# Patient Record
Sex: MALE | Race: BLACK OR AFRICAN AMERICAN | NOT HISPANIC OR LATINO | Employment: OTHER | RURAL
[De-identification: names, ages, dates, MRNs, and addresses within clinical notes are randomized per-mention and may not be internally consistent; named-entity substitution may affect disease eponyms.]

---

## 2017-04-07 DIAGNOSIS — I63.9 CEREBROVASCULAR ACCIDENT (CVA), UNSPECIFIED MECHANISM (HCC): ICD-10-CM

## 2017-04-07 RX ORDER — CLOPIDOGREL BISULFATE 75 MG/1
TABLET ORAL
Qty: 90 TABLET | Refills: 1 | Status: SHIPPED | OUTPATIENT
Start: 2017-04-07 | End: 2017-06-08 | Stop reason: SDUPTHER

## 2017-06-08 ENCOUNTER — OFFICE VISIT (OUTPATIENT)
Dept: FAMILY MEDICINE CLINIC | Facility: CLINIC | Age: 72
End: 2017-06-08

## 2017-06-08 VITALS
DIASTOLIC BLOOD PRESSURE: 80 MMHG | HEART RATE: 88 BPM | SYSTOLIC BLOOD PRESSURE: 114 MMHG | BODY MASS INDEX: 19.7 KG/M2 | RESPIRATION RATE: 18 BRPM | TEMPERATURE: 98.5 F | HEIGHT: 68 IN | OXYGEN SATURATION: 99 % | WEIGHT: 130 LBS

## 2017-06-08 DIAGNOSIS — R29.898 WEAKNESS OF LEFT LEG: Primary | ICD-10-CM

## 2017-06-08 DIAGNOSIS — R21 RASH: ICD-10-CM

## 2017-06-08 DIAGNOSIS — I10 ESSENTIAL HYPERTENSION: ICD-10-CM

## 2017-06-08 DIAGNOSIS — N40.0 BENIGN PROSTATIC HYPERPLASIA, PRESENCE OF LOWER URINARY TRACT SYMPTOMS UNSPECIFIED, UNSPECIFIED MORPHOLOGY: ICD-10-CM

## 2017-06-08 DIAGNOSIS — K21.9 GASTROESOPHAGEAL REFLUX DISEASE, ESOPHAGITIS PRESENCE NOT SPECIFIED: ICD-10-CM

## 2017-06-08 DIAGNOSIS — I63.9 CEREBROVASCULAR ACCIDENT (CVA), UNSPECIFIED MECHANISM (HCC): ICD-10-CM

## 2017-06-08 PROCEDURE — 99214 OFFICE O/P EST MOD 30 MIN: CPT | Performed by: NURSE PRACTITIONER

## 2017-06-08 RX ORDER — RAMIPRIL 5 MG/1
5 CAPSULE ORAL DAILY
Qty: 14 CAPSULE | Refills: 1 | Status: SHIPPED | OUTPATIENT
Start: 2017-06-08 | End: 2017-06-08 | Stop reason: SDUPTHER

## 2017-06-08 RX ORDER — RANITIDINE 150 MG/1
150 TABLET ORAL 2 TIMES DAILY
Qty: 180 TABLET | Refills: 1 | Status: SHIPPED | OUTPATIENT
Start: 2017-06-08 | End: 2017-12-19 | Stop reason: SDUPTHER

## 2017-06-08 RX ORDER — TRIAMCINOLONE ACETONIDE 1 MG/G
CREAM TOPICAL 2 TIMES DAILY
Qty: 45 G | Refills: 3 | Status: SHIPPED | OUTPATIENT
Start: 2017-06-08 | End: 2017-06-08 | Stop reason: SDUPTHER

## 2017-06-08 RX ORDER — RANITIDINE 150 MG/1
150 TABLET ORAL 2 TIMES DAILY
Qty: 180 TABLET | Refills: 1 | Status: SHIPPED | OUTPATIENT
Start: 2017-06-08 | End: 2017-06-08 | Stop reason: SDUPTHER

## 2017-06-08 RX ORDER — RAMIPRIL 5 MG/1
5 CAPSULE ORAL DAILY
Qty: 90 CAPSULE | Refills: 1 | Status: SHIPPED | OUTPATIENT
Start: 2017-06-08 | End: 2017-06-08 | Stop reason: SDUPTHER

## 2017-06-08 RX ORDER — AMLODIPINE BESYLATE 5 MG/1
5 TABLET ORAL DAILY
Qty: 90 TABLET | Refills: 1 | Status: SHIPPED | OUTPATIENT
Start: 2017-06-08 | End: 2017-06-08 | Stop reason: SDUPTHER

## 2017-06-08 RX ORDER — TAMSULOSIN HYDROCHLORIDE 0.4 MG/1
1 CAPSULE ORAL NIGHTLY
Qty: 90 CAPSULE | Refills: 1 | Status: SHIPPED | OUTPATIENT
Start: 2017-06-08 | End: 2017-06-08 | Stop reason: SDUPTHER

## 2017-06-08 RX ORDER — RAMIPRIL 5 MG/1
5 CAPSULE ORAL DAILY
Qty: 90 CAPSULE | Refills: 1 | Status: SHIPPED | OUTPATIENT
Start: 2017-06-08 | End: 2017-12-19 | Stop reason: SDUPTHER

## 2017-06-08 RX ORDER — TAMSULOSIN HYDROCHLORIDE 0.4 MG/1
1 CAPSULE ORAL NIGHTLY
Qty: 90 CAPSULE | Refills: 1 | Status: SHIPPED | OUTPATIENT
Start: 2017-06-08 | End: 2017-12-19 | Stop reason: SDUPTHER

## 2017-06-08 RX ORDER — CLOPIDOGREL BISULFATE 75 MG/1
75 TABLET ORAL DAILY
Qty: 90 TABLET | Refills: 1 | Status: SHIPPED | OUTPATIENT
Start: 2017-06-08 | End: 2017-10-27 | Stop reason: SDUPTHER

## 2017-06-08 RX ORDER — ASPIRIN 81 MG/1
81 TABLET, CHEWABLE ORAL DAILY
Qty: 90 TABLET | Refills: 1 | Status: SHIPPED | OUTPATIENT
Start: 2017-06-08 | End: 2017-12-19 | Stop reason: SDUPTHER

## 2017-06-08 RX ORDER — ASPIRIN 81 MG/1
81 TABLET, CHEWABLE ORAL DAILY
Qty: 90 TABLET | Refills: 1 | Status: SHIPPED | OUTPATIENT
Start: 2017-06-08 | End: 2017-06-08 | Stop reason: SDUPTHER

## 2017-06-08 RX ORDER — AMLODIPINE BESYLATE 5 MG/1
5 TABLET ORAL DAILY
Qty: 90 TABLET | Refills: 1 | Status: SHIPPED | OUTPATIENT
Start: 2017-06-08 | End: 2017-12-19 | Stop reason: SDUPTHER

## 2017-06-08 RX ORDER — TRIAMCINOLONE ACETONIDE 1 MG/G
CREAM TOPICAL 2 TIMES DAILY
Qty: 45 G | Refills: 3 | Status: SHIPPED | OUTPATIENT
Start: 2017-06-08 | End: 2018-07-16

## 2017-06-08 NOTE — PROGRESS NOTES
Subjective   Jonnathan Albert is a 72 y.o. male.     HPI Comments: Here today for yohana.  Wife brings mr albert in and says that she thinks he might have had a small stroke about 5 days ago.  He apparently fell at home.   After that for a few days he tended to drag his left leg and seemed to be weaker on his left side.  She also says he has a rash on both thighs and on his right knee. Has had for months.  Seems to wax and wane and she has used gold bond on it.  Has improved somewhat.  The skin is not as thick and discolored as prev.    Extremity Weakness    Pain location: left lower ext seems to be the worse. This is a new (left sided) problem. The current episode started in the past 7 days. There has been a history of trauma (did fall, but wife is unsure if this the problem). Episode frequency: seems to improving. The problem has been waxing and waning. The pain is at a severity of 0/10. The patient is experiencing no pain. Pertinent negatives include no fever, inability to bear weight, itching, joint locking, joint swelling, limited range of motion, numbness, stiffness or tingling. The symptoms are aggravated by standing. He has tried nothing for the symptoms. The treatment provided moderate relief. Family history does not include gout or rheumatoid arthritis. His past medical history is significant for osteoarthritis. There is no history of diabetes, gout or rheumatoid arthritis.        The following portions of the patient's history were reviewed and updated as appropriate: allergies, current medications, past family history, past medical history, past social history, past surgical history and problem list.    Review of Systems   Constitutional: Negative for fever.   HENT: Negative.    Respiratory: Negative.    Cardiovascular: Negative.    Musculoskeletal: Positive for extremity weakness. Negative for gout and stiffness.   Skin: Positive for rash. Negative for itching.   Neurological: Positive for weakness.  Negative for tingling and numbness.   Psychiatric/Behavioral: Negative.        Objective   Physical Exam   Constitutional: He is oriented to person, place, and time. He appears well-developed and well-nourished. No distress.   HENT:   Head: Normocephalic.   Neck: Normal range of motion. Neck supple. No thyromegaly present.   Cardiovascular: Normal rate, regular rhythm and normal heart sounds.  Exam reveals no friction rub.    No murmur heard.  Pulmonary/Chest: Effort normal and breath sounds normal. No respiratory distress. He has no wheezes. He has no rales.   Musculoskeletal: Normal range of motion.   Slightly weaker on the left than the right.    Neurological: He is alert and oriented to person, place, and time.   Skin: Skin is warm and dry.   Thick plaque like lesions noted on both thighs and his right knee.   Psychiatric: He has a normal mood and affect. Thought content normal.   Nursing note and vitals reviewed.      Assessment/Plan   Jonnathan was seen today for possible stroke.    Diagnoses and all orders for this visit:    Weakness of left leg    Rash  -     Discontinue: triamcinolone (KENALOG) 0.1 % cream; Apply  topically 2 (Two) Times a Day.  -     triamcinolone (KENALOG) 0.1 % cream; Apply  topically 2 (Two) Times a Day.    Essential hypertension  -     Discontinue: ramipril (ALTACE) 5 MG capsule; Take 1 capsule by mouth Daily.  -     Discontinue: amLODIPine (NORVASC) 5 MG tablet; Take 1 tablet by mouth Daily.  -     Discontinue: aspirin 81 MG chewable tablet; Chew 1 tablet Daily.  -     Discontinue: ramipril (ALTACE) 5 MG capsule; Take 1 capsule by mouth Daily.  -     amLODIPine (NORVASC) 5 MG tablet; Take 1 tablet by mouth Daily.  -     ramipril (ALTACE) 5 MG capsule; Take 1 capsule by mouth Daily.  -     aspirin 81 MG chewable tablet; Chew 1 tablet Daily.    Cerebrovascular accident (CVA), unspecified mechanism  -     clopidogrel (PLAVIX) 75 MG tablet; Take 1 tablet by mouth Daily.    Benign prostatic  hyperplasia, presence of lower urinary tract symptoms unspecified, unspecified morphology  -     Discontinue: tamsulosin (FLOMAX) 0.4 MG capsule 24 hr capsule; Take 1 capsule by mouth Every Night.  -     tamsulosin (FLOMAX) 0.4 MG capsule 24 hr capsule; Take 1 capsule by mouth Every Night.    Gastroesophageal reflux disease, esophagitis presence not specified  -     Discontinue: raNITIdine (ZANTAC) 150 MG tablet; Take 1 tablet by mouth 2 (Two) Times a Day.  -     raNITIdine (ZANTAC) 150 MG tablet; Take 1 tablet by mouth 2 (Two) Times a Day.    have offered to send him for a ct of his head and have offered to send out home ki for pt.  He has refused both of these.

## 2017-10-27 DIAGNOSIS — I63.9 CEREBROVASCULAR ACCIDENT (CVA), UNSPECIFIED MECHANISM (HCC): ICD-10-CM

## 2017-10-27 RX ORDER — CLOPIDOGREL BISULFATE 75 MG/1
TABLET ORAL
Qty: 90 TABLET | Refills: 1 | Status: SHIPPED | OUTPATIENT
Start: 2017-10-27 | End: 2017-12-19 | Stop reason: SDUPTHER

## 2017-12-19 ENCOUNTER — OFFICE VISIT (OUTPATIENT)
Dept: FAMILY MEDICINE CLINIC | Facility: CLINIC | Age: 72
End: 2017-12-19

## 2017-12-19 VITALS
SYSTOLIC BLOOD PRESSURE: 133 MMHG | BODY MASS INDEX: 20 KG/M2 | HEIGHT: 68 IN | DIASTOLIC BLOOD PRESSURE: 86 MMHG | OXYGEN SATURATION: 99 % | HEART RATE: 86 BPM | TEMPERATURE: 98.2 F | WEIGHT: 132 LBS | RESPIRATION RATE: 20 BRPM

## 2017-12-19 DIAGNOSIS — K21.9 GASTROESOPHAGEAL REFLUX DISEASE, ESOPHAGITIS PRESENCE NOT SPECIFIED: ICD-10-CM

## 2017-12-19 DIAGNOSIS — N40.1 BENIGN PROSTATIC HYPERPLASIA WITH LOWER URINARY TRACT SYMPTOMS, SYMPTOM DETAILS UNSPECIFIED: ICD-10-CM

## 2017-12-19 DIAGNOSIS — I63.9 CEREBROVASCULAR ACCIDENT (CVA), UNSPECIFIED MECHANISM (HCC): ICD-10-CM

## 2017-12-19 DIAGNOSIS — I10 ESSENTIAL HYPERTENSION: Primary | ICD-10-CM

## 2017-12-19 PROCEDURE — 99214 OFFICE O/P EST MOD 30 MIN: CPT | Performed by: NURSE PRACTITIONER

## 2017-12-19 PROCEDURE — 36415 COLL VENOUS BLD VENIPUNCTURE: CPT | Performed by: NURSE PRACTITIONER

## 2017-12-19 PROCEDURE — 80061 LIPID PANEL: CPT | Performed by: NURSE PRACTITIONER

## 2017-12-19 PROCEDURE — 80053 COMPREHEN METABOLIC PANEL: CPT | Performed by: NURSE PRACTITIONER

## 2017-12-19 PROCEDURE — 84153 ASSAY OF PSA TOTAL: CPT | Performed by: NURSE PRACTITIONER

## 2017-12-19 RX ORDER — CLOPIDOGREL BISULFATE 75 MG/1
75 TABLET ORAL DAILY
Qty: 90 TABLET | Refills: 1 | Status: SHIPPED | OUTPATIENT
Start: 2017-12-19 | End: 2018-07-13 | Stop reason: SDUPTHER

## 2017-12-19 RX ORDER — AMLODIPINE BESYLATE 5 MG/1
5 TABLET ORAL DAILY
Qty: 90 TABLET | Refills: 1 | Status: SHIPPED | OUTPATIENT
Start: 2017-12-19 | End: 2018-07-13 | Stop reason: SDUPTHER

## 2017-12-19 RX ORDER — RANITIDINE 150 MG/1
150 TABLET ORAL NIGHTLY
Qty: 90 TABLET | Refills: 1 | Status: SHIPPED | OUTPATIENT
Start: 2017-12-19 | End: 2018-07-16

## 2017-12-19 RX ORDER — RAMIPRIL 5 MG/1
5 CAPSULE ORAL DAILY
Qty: 90 CAPSULE | Refills: 1 | Status: SHIPPED | OUTPATIENT
Start: 2017-12-19 | End: 2017-12-19 | Stop reason: SDUPTHER

## 2017-12-19 RX ORDER — RAMIPRIL 5 MG/1
5 CAPSULE ORAL DAILY
Qty: 30 CAPSULE | Refills: 1 | Status: SHIPPED | OUTPATIENT
Start: 2017-12-19 | End: 2017-12-19 | Stop reason: SDUPTHER

## 2017-12-19 RX ORDER — RAMIPRIL 5 MG/1
5 CAPSULE ORAL DAILY
Qty: 30 CAPSULE | Refills: 0 | Status: SHIPPED | OUTPATIENT
Start: 2017-12-19 | End: 2018-07-13 | Stop reason: SDUPTHER

## 2017-12-19 RX ORDER — TAMSULOSIN HYDROCHLORIDE 0.4 MG/1
2 CAPSULE ORAL NIGHTLY
Qty: 180 CAPSULE | Refills: 1 | Status: SHIPPED | OUTPATIENT
Start: 2017-12-19 | End: 2018-07-13 | Stop reason: SDUPTHER

## 2017-12-19 RX ORDER — ASPIRIN 81 MG/1
81 TABLET, CHEWABLE ORAL DAILY
Qty: 90 TABLET | Refills: 1 | Status: SHIPPED | OUTPATIENT
Start: 2017-12-19 | End: 2018-07-13 | Stop reason: SDUPTHER

## 2017-12-19 RX ORDER — TAMSULOSIN HYDROCHLORIDE 0.4 MG/1
1 CAPSULE ORAL NIGHTLY
Qty: 90 CAPSULE | Refills: 1 | Status: SHIPPED | OUTPATIENT
Start: 2017-12-19 | End: 2017-12-19 | Stop reason: SDUPTHER

## 2017-12-19 NOTE — PROGRESS NOTES
Subjective   Jonnathan Albert is a 72 y.o. male.     HPI Comments: Here today for yohana.  Does have dementia and is brought in by his wife.      Hypertension   This is a chronic problem. The current episode started more than 1 year ago. The problem is controlled. Associated symptoms include anxiety. Pertinent negatives include no blurred vision, chest pain, headaches, malaise/fatigue, neck pain, orthopnea, palpitations, peripheral edema, PND or sweats. There are no associated agents to hypertension. Risk factors for coronary artery disease include male gender and sedentary lifestyle. Past treatments include calcium channel blockers and ACE inhibitors. The current treatment provides significant improvement. Compliance problems include psychosocial issues.  Hypertensive end-organ damage includes CVA. There is no history of angina, kidney disease, CAD/MI, heart failure, left ventricular hypertrophy, PVD or retinopathy.   Dementia   This is a chronic problem. The current episode started more than 1 year ago. The problem occurs constantly. The problem has been unchanged. Pertinent negatives include no abdominal pain, anorexia, arthralgias, change in bowel habit, chest pain, chills, congestion, coughing, diaphoresis, fatigue, fever, headaches, joint swelling, myalgias, nausea, neck pain, numbness, rash, sore throat, swollen glands, urinary symptoms, vertigo, visual change, vomiting or weakness. Nothing aggravates the symptoms. He has tried nothing for the symptoms. The treatment provided no relief.        The following portions of the patient's history were reviewed and updated as appropriate: allergies, current medications, past family history, past medical history, past social history, past surgical history and problem list.    Review of Systems   Constitutional: Negative.  Negative for chills, diaphoresis, fatigue, fever and malaise/fatigue.   HENT: Negative.  Negative for congestion and sore throat.    Eyes: Negative  for blurred vision.   Respiratory: Negative.  Negative for cough.    Cardiovascular: Negative.  Negative for chest pain, palpitations, orthopnea and PND.   Gastrointestinal: Negative for abdominal pain, anorexia, change in bowel habit, nausea and vomiting.   Musculoskeletal: Negative.  Negative for arthralgias, joint swelling, myalgias and neck pain.   Skin: Negative.  Negative for rash.   Neurological: Negative.  Negative for vertigo, weakness, numbness and headaches.   Psychiatric/Behavioral: Positive for agitation and confusion.       Objective   Physical Exam   Constitutional: He is oriented to person, place, and time. He appears well-developed and well-nourished. No distress.   HENT:   Head: Normocephalic.   Eyes: Pupils are equal, round, and reactive to light.   Neck: Normal range of motion. Neck supple. No thyromegaly present.   Cardiovascular: Normal rate, regular rhythm and normal heart sounds.  Exam reveals no friction rub.    No murmur heard.  Pulmonary/Chest: Effort normal and breath sounds normal. No respiratory distress. He has no wheezes. He has no rales.   Abdominal: Soft.   Musculoskeletal: Normal range of motion.   Neurological: He is alert and oriented to person, place, and time.   Is cooperative today   Skin: Skin is warm and dry.   Psychiatric:   Affect is flat   Nursing note and vitals reviewed.      Assessment/Plan   Jonnathan was seen today for hypertension, anxiety, hyperlipidemia and dementia.    Diagnoses and all orders for this visit:    Essential hypertension  -     Discontinue: ramipril (ALTACE) 5 MG capsule; Take 1 capsule by mouth Daily.  -     Discontinue: ramipril (ALTACE) 5 MG capsule; Take 1 capsule by mouth Daily.  -     amLODIPine (NORVASC) 5 MG tablet; Take 1 tablet by mouth Daily.  -     aspirin 81 MG chewable tablet; Chew 1 tablet Daily.  -     ramipril (ALTACE) 5 MG capsule; Take 1 capsule by mouth Daily.  -     Comprehensive Metabolic Panel  -     Lipid  panel    Cerebrovascular accident (CVA), unspecified mechanism  -     clopidogrel (PLAVIX) 75 MG tablet; Take 1 tablet by mouth Daily.    Gastroesophageal reflux disease, esophagitis presence not specified  -     raNITIdine (ZANTAC) 150 MG tablet; Take 1 tablet by mouth Every Night.    Benign prostatic hyperplasia with lower urinary tract symptoms, symptom details unspecified  -     tamsulosin (FLOMAX) 0.4 MG capsule 24 hr capsule; Take 2 capsules by mouth Every Night.  -     PSA    Other orders  -     Discontinue: tamsulosin (FLOMAX) 0.4 MG capsule 24 hr capsule; Take 1 capsule by mouth Every Night.      Wife reports more urinary dribbling.  Will increase his flomax.  He is sent in refill on his other meds.

## 2017-12-20 LAB
ALBUMIN SERPL-MCNC: 3.9 G/DL (ref 3.4–4.8)
ALBUMIN/GLOB SERPL: 0.9 G/DL (ref 1.1–1.8)
ALP SERPL-CCNC: 57 U/L (ref 38–126)
ALT SERPL W P-5'-P-CCNC: 40 U/L (ref 21–72)
ANION GAP SERPL CALCULATED.3IONS-SCNC: 11 MMOL/L (ref 5–15)
ARTICHOKE IGE QN: 59 MG/DL (ref 1–129)
AST SERPL-CCNC: 48 U/L (ref 17–59)
BILIRUB SERPL-MCNC: 0.6 MG/DL (ref 0.2–1.3)
BUN BLD-MCNC: 20 MG/DL (ref 7–21)
BUN/CREAT SERPL: 21.5 (ref 7–25)
CALCIUM SPEC-SCNC: 8.8 MG/DL (ref 8.4–10.2)
CHLORIDE SERPL-SCNC: 111 MMOL/L (ref 95–110)
CHOLEST SERPL-MCNC: 105 MG/DL (ref 0–199)
CO2 SERPL-SCNC: 20 MMOL/L (ref 22–31)
CREAT BLD-MCNC: 0.93 MG/DL (ref 0.7–1.3)
GFR SERPL CREATININE-BSD FRML MDRD: 97 ML/MIN/1.73 (ref 42–98)
GLOBULIN UR ELPH-MCNC: 4.4 GM/DL (ref 2.3–3.5)
GLUCOSE BLD-MCNC: 85 MG/DL (ref 60–100)
HDLC SERPL-MCNC: 34 MG/DL (ref 60–200)
LDLC/HDLC SERPL: 1.6 {RATIO} (ref 0–3.55)
POTASSIUM BLD-SCNC: 3.9 MMOL/L (ref 3.5–5.1)
PROT SERPL-MCNC: 8.3 G/DL (ref 6.3–8.6)
PSA SERPL-MCNC: 1.01 NG/ML (ref 0–4)
SODIUM BLD-SCNC: 142 MMOL/L (ref 137–145)
TRIGL SERPL-MCNC: 83 MG/DL (ref 20–199)

## 2018-07-13 ENCOUNTER — OFFICE VISIT (OUTPATIENT)
Dept: FAMILY MEDICINE CLINIC | Facility: CLINIC | Age: 73
End: 2018-07-13

## 2018-07-13 VITALS
TEMPERATURE: 97.8 F | DIASTOLIC BLOOD PRESSURE: 70 MMHG | SYSTOLIC BLOOD PRESSURE: 110 MMHG | WEIGHT: 132.7 LBS | HEART RATE: 67 BPM | HEIGHT: 68 IN | BODY MASS INDEX: 20.11 KG/M2 | OXYGEN SATURATION: 99 %

## 2018-07-13 DIAGNOSIS — I63.9 CEREBROVASCULAR ACCIDENT (CVA), UNSPECIFIED MECHANISM (HCC): ICD-10-CM

## 2018-07-13 DIAGNOSIS — N40.1 BENIGN PROSTATIC HYPERPLASIA WITH LOWER URINARY TRACT SYMPTOMS, SYMPTOM DETAILS UNSPECIFIED: ICD-10-CM

## 2018-07-13 DIAGNOSIS — F03.91 DEMENTIA WITH BEHAVIORAL DISTURBANCE, UNSPECIFIED DEMENTIA TYPE: Primary | ICD-10-CM

## 2018-07-13 DIAGNOSIS — I10 ESSENTIAL HYPERTENSION: ICD-10-CM

## 2018-07-13 LAB
ALBUMIN SERPL-MCNC: 3.5 G/DL (ref 3.4–4.8)
ALBUMIN/GLOB SERPL: 0.9 G/DL (ref 1.1–1.8)
ALP SERPL-CCNC: 47 U/L (ref 38–126)
ALT SERPL W P-5'-P-CCNC: 28 U/L (ref 21–72)
ANION GAP SERPL CALCULATED.3IONS-SCNC: 11 MMOL/L (ref 5–15)
AST SERPL-CCNC: 35 U/L (ref 17–59)
BILIRUB SERPL-MCNC: 0.4 MG/DL (ref 0.2–1.3)
BUN BLD-MCNC: 26 MG/DL (ref 7–21)
BUN/CREAT SERPL: 25 (ref 7–25)
CALCIUM SPEC-SCNC: 8.3 MG/DL (ref 8.4–10.2)
CHLORIDE SERPL-SCNC: 109 MMOL/L (ref 95–110)
CO2 SERPL-SCNC: 19 MMOL/L (ref 22–31)
CREAT BLD-MCNC: 1.04 MG/DL (ref 0.7–1.3)
GFR SERPL CREATININE-BSD FRML MDRD: 85 ML/MIN/1.73 (ref 42–98)
GLOBULIN UR ELPH-MCNC: 4 GM/DL (ref 2.3–3.5)
GLUCOSE BLD-MCNC: 75 MG/DL (ref 60–100)
POTASSIUM BLD-SCNC: 4.4 MMOL/L (ref 3.5–5.1)
PROT SERPL-MCNC: 7.5 G/DL (ref 6.3–8.6)
SODIUM BLD-SCNC: 139 MMOL/L (ref 137–145)

## 2018-07-13 PROCEDURE — 99213 OFFICE O/P EST LOW 20 MIN: CPT | Performed by: NURSE PRACTITIONER

## 2018-07-13 PROCEDURE — 80053 COMPREHEN METABOLIC PANEL: CPT | Performed by: NURSE PRACTITIONER

## 2018-07-13 PROCEDURE — 36415 COLL VENOUS BLD VENIPUNCTURE: CPT | Performed by: NURSE PRACTITIONER

## 2018-07-13 RX ORDER — AMLODIPINE BESYLATE 5 MG/1
5 TABLET ORAL DAILY
Qty: 90 TABLET | Refills: 1 | Status: SHIPPED | OUTPATIENT
Start: 2018-07-13 | End: 2018-07-16 | Stop reason: SDUPTHER

## 2018-07-13 RX ORDER — ASPIRIN 81 MG/1
81 TABLET, CHEWABLE ORAL DAILY
Qty: 90 TABLET | Refills: 1 | Status: SHIPPED | OUTPATIENT
Start: 2018-07-13 | End: 2018-07-16 | Stop reason: SDUPTHER

## 2018-07-13 RX ORDER — RANITIDINE 150 MG/1
150 TABLET ORAL 2 TIMES DAILY
COMMUNITY
End: 2018-07-13 | Stop reason: SDUPTHER

## 2018-07-13 RX ORDER — CLOPIDOGREL BISULFATE 75 MG/1
75 TABLET ORAL DAILY
Qty: 90 TABLET | Refills: 1 | Status: SHIPPED | OUTPATIENT
Start: 2018-07-13 | End: 2018-07-16 | Stop reason: SDUPTHER

## 2018-07-13 RX ORDER — RANITIDINE 150 MG/1
150 TABLET ORAL 2 TIMES DAILY
Qty: 180 TABLET | Refills: 1 | Status: SHIPPED | OUTPATIENT
Start: 2018-07-13 | End: 2018-07-16 | Stop reason: SDUPTHER

## 2018-07-13 RX ORDER — RAMIPRIL 5 MG/1
5 CAPSULE ORAL DAILY
Qty: 30 CAPSULE | Refills: 0 | Status: SHIPPED | OUTPATIENT
Start: 2018-07-13 | End: 2018-07-16 | Stop reason: SDUPTHER

## 2018-07-13 RX ORDER — TAMSULOSIN HYDROCHLORIDE 0.4 MG/1
2 CAPSULE ORAL NIGHTLY
Qty: 180 CAPSULE | Refills: 1 | Status: SHIPPED | OUTPATIENT
Start: 2018-07-13 | End: 2018-07-16 | Stop reason: SDUPTHER

## 2018-07-16 DIAGNOSIS — I63.9 CEREBROVASCULAR ACCIDENT (CVA), UNSPECIFIED MECHANISM (HCC): ICD-10-CM

## 2018-07-16 DIAGNOSIS — I10 ESSENTIAL HYPERTENSION: ICD-10-CM

## 2018-07-16 DIAGNOSIS — N40.1 BENIGN PROSTATIC HYPERPLASIA WITH LOWER URINARY TRACT SYMPTOMS, SYMPTOM DETAILS UNSPECIFIED: ICD-10-CM

## 2018-07-16 RX ORDER — TAMSULOSIN HYDROCHLORIDE 0.4 MG/1
2 CAPSULE ORAL NIGHTLY
Qty: 180 CAPSULE | Refills: 1 | Status: SHIPPED | OUTPATIENT
Start: 2018-07-16 | End: 2018-07-19 | Stop reason: SDUPTHER

## 2018-07-16 RX ORDER — CLOPIDOGREL BISULFATE 75 MG/1
75 TABLET ORAL DAILY
Qty: 90 TABLET | Refills: 1 | Status: SHIPPED | OUTPATIENT
Start: 2018-07-16 | End: 2018-07-19 | Stop reason: SDUPTHER

## 2018-07-16 RX ORDER — ASPIRIN 81 MG/1
81 TABLET, CHEWABLE ORAL DAILY
Qty: 90 TABLET | Refills: 1 | Status: SHIPPED | OUTPATIENT
Start: 2018-07-16 | End: 2019-01-25 | Stop reason: SDUPTHER

## 2018-07-16 RX ORDER — RANITIDINE 150 MG/1
150 TABLET ORAL 2 TIMES DAILY
Qty: 180 TABLET | Refills: 1 | Status: SHIPPED | OUTPATIENT
Start: 2018-07-16 | End: 2019-01-03 | Stop reason: SDUPTHER

## 2018-07-16 RX ORDER — AMLODIPINE BESYLATE 5 MG/1
5 TABLET ORAL DAILY
Qty: 90 TABLET | Refills: 1 | Status: SHIPPED | OUTPATIENT
Start: 2018-07-16 | End: 2018-07-19 | Stop reason: SDUPTHER

## 2018-07-16 RX ORDER — RAMIPRIL 5 MG/1
5 CAPSULE ORAL DAILY
Qty: 90 CAPSULE | Refills: 1 | Status: SHIPPED | OUTPATIENT
Start: 2018-07-16 | End: 2019-01-25 | Stop reason: SDUPTHER

## 2018-07-16 NOTE — PROGRESS NOTES
Subjective   Jonnathan Albert is a 73 y.o. male.     Brought in by his wife for yohana on his b/p.  He cont to require constant care at home due to his dementia.  B/p at this time is stable.  Is is not taking the anxiety medications.      Hypertension   This is a chronic problem. The current episode started more than 1 year ago. The problem is controlled. Pertinent negatives include no anxiety, blurred vision, chest pain, headaches, malaise/fatigue, neck pain, orthopnea, palpitations, peripheral edema, PND, shortness of breath or sweats. There are no associated agents to hypertension. Risk factors for coronary artery disease include male gender and sedentary lifestyle. Past treatments include ACE inhibitors and calcium channel blockers. Current antihypertension treatment includes ACE inhibitors and calcium channel blockers. The current treatment provides significant improvement. Compliance problems include psychosocial issues.  There is no history of angina, kidney disease, CAD/MI, CVA, heart failure, left ventricular hypertrophy, PVD or retinopathy.   Dementia   This is a chronic problem. The current episode started more than 1 year ago. The problem occurs constantly. The problem has been gradually worsening. Pertinent negatives include no abdominal pain, anorexia, arthralgias, change in bowel habit, chest pain, chills, congestion, coughing, diaphoresis, fatigue, fever, headaches, joint swelling, myalgias, nausea, neck pain, numbness, rash, sore throat, swollen glands, urinary symptoms, vertigo, visual change, vomiting or weakness. The symptoms are aggravated by exertion. Treatments tried: xanax. The treatment provided no relief.        The following portions of the patient's history were reviewed and updated as appropriate: allergies, current medications, past family history, past medical history, past social history, past surgical history and problem list.    Review of Systems   Constitutional: Negative.   Negative for chills, diaphoresis, fatigue, fever and malaise/fatigue.   HENT: Negative.  Negative for congestion and sore throat.    Eyes: Negative for blurred vision.   Respiratory: Negative.  Negative for cough and shortness of breath.    Cardiovascular: Negative.  Negative for chest pain, palpitations, orthopnea and PND.   Gastrointestinal: Negative for abdominal pain, anorexia, change in bowel habit, nausea and vomiting.   Musculoskeletal: Negative.  Negative for arthralgias, joint swelling, myalgias and neck pain.   Skin: Negative.  Negative for rash.   Neurological: Negative.  Negative for vertigo, weakness and numbness.   Psychiatric/Behavioral: Positive for agitation, behavioral problems, decreased concentration and sleep disturbance.       Objective   Physical Exam      Assessment/Plan   Jonnathan was seen today for hypertension, anxiety, hyperlipidemia and dementia.    Diagnoses and all orders for this visit:    Dementia with behavioral disturbance, unspecified dementia type    Essential hypertension  -     amLODIPine (NORVASC) 5 MG tablet; Take 1 tablet by mouth Daily.  -     aspirin 81 MG chewable tablet; Chew 1 tablet Daily.  -     ramipril (ALTACE) 5 MG capsule; Take 1 capsule by mouth Daily.  -     Comprehensive metabolic panel    Cerebrovascular accident (CVA), unspecified mechanism (CMS/HCC)  -     clopidogrel (PLAVIX) 75 MG tablet; Take 1 tablet by mouth Daily.    Benign prostatic hyperplasia with lower urinary tract symptoms, symptom details unspecified  -     tamsulosin (FLOMAX) 0.4 MG capsule 24 hr capsule; Take 2 capsules by mouth Every Night.    Other orders  -     raNITIdine (ZANTAC) 150 MG tablet; Take 1 tablet by mouth 2 (Two) Times a Day.      Cont with present meds.

## 2018-07-19 DIAGNOSIS — N40.1 BENIGN PROSTATIC HYPERPLASIA WITH LOWER URINARY TRACT SYMPTOMS, SYMPTOM DETAILS UNSPECIFIED: ICD-10-CM

## 2018-07-19 DIAGNOSIS — I63.9 CEREBROVASCULAR ACCIDENT (CVA), UNSPECIFIED MECHANISM (HCC): ICD-10-CM

## 2018-07-19 DIAGNOSIS — I10 ESSENTIAL HYPERTENSION: ICD-10-CM

## 2018-07-19 RX ORDER — TAMSULOSIN HYDROCHLORIDE 0.4 MG/1
2 CAPSULE ORAL NIGHTLY
Qty: 180 CAPSULE | Refills: 1 | Status: SHIPPED | OUTPATIENT
Start: 2018-07-19 | End: 2019-01-03 | Stop reason: SDUPTHER

## 2018-07-19 RX ORDER — CLOPIDOGREL BISULFATE 75 MG/1
75 TABLET ORAL DAILY
Qty: 90 TABLET | Refills: 1 | Status: SHIPPED | OUTPATIENT
Start: 2018-07-19 | End: 2019-01-03 | Stop reason: SDUPTHER

## 2018-07-19 RX ORDER — AMLODIPINE BESYLATE 5 MG/1
5 TABLET ORAL DAILY
Qty: 90 TABLET | Refills: 1 | Status: SHIPPED | OUTPATIENT
Start: 2018-07-19 | End: 2019-01-25 | Stop reason: SDUPTHER

## 2018-09-24 ENCOUNTER — TELEPHONE (OUTPATIENT)
Dept: FAMILY MEDICINE CLINIC | Facility: CLINIC | Age: 73
End: 2018-09-24

## 2018-09-24 RX ORDER — MEMANTINE HYDROCHLORIDE 5 MG/1
5 TABLET ORAL 2 TIMES DAILY
Qty: 60 TABLET | Refills: 3 | Status: SHIPPED | OUTPATIENT
Start: 2018-09-24 | End: 2019-01-25 | Stop reason: SDUPTHER

## 2019-01-01 ENCOUNTER — TELEPHONE (OUTPATIENT)
Dept: FAMILY MEDICINE CLINIC | Facility: CLINIC | Age: 74
End: 2019-01-01

## 2019-01-01 ENCOUNTER — OFFICE VISIT (OUTPATIENT)
Dept: FAMILY MEDICINE CLINIC | Facility: CLINIC | Age: 74
End: 2019-01-01

## 2019-01-01 ENCOUNTER — APPOINTMENT (OUTPATIENT)
Dept: LAB | Facility: HOSPITAL | Age: 74
End: 2019-01-01

## 2019-01-01 VITALS
RESPIRATION RATE: 20 BRPM | DIASTOLIC BLOOD PRESSURE: 66 MMHG | HEIGHT: 68 IN | BODY MASS INDEX: 20 KG/M2 | SYSTOLIC BLOOD PRESSURE: 98 MMHG | WEIGHT: 132 LBS | HEART RATE: 67 BPM | TEMPERATURE: 97.8 F | OXYGEN SATURATION: 98 %

## 2019-01-01 DIAGNOSIS — I10 ESSENTIAL HYPERTENSION: Primary | ICD-10-CM

## 2019-01-01 DIAGNOSIS — I10 ESSENTIAL HYPERTENSION: ICD-10-CM

## 2019-01-01 DIAGNOSIS — Z13.0 SCREENING FOR DEFICIENCY ANEMIA: ICD-10-CM

## 2019-01-01 DIAGNOSIS — Z13.29 SCREENING FOR THYROID DISORDER: ICD-10-CM

## 2019-01-01 DIAGNOSIS — E03.9 HYPOTHYROIDISM, UNSPECIFIED TYPE: Primary | ICD-10-CM

## 2019-01-01 LAB
ALBUMIN SERPL-MCNC: 3.1 G/DL (ref 3.5–5.2)
ALBUMIN/GLOB SERPL: 0.7 G/DL
ALP SERPL-CCNC: 50 U/L (ref 39–117)
ALT SERPL W P-5'-P-CCNC: 18 U/L (ref 1–41)
ANION GAP SERPL CALCULATED.3IONS-SCNC: 13 MMOL/L (ref 5–15)
AST SERPL-CCNC: 39 U/L (ref 1–40)
BILIRUB SERPL-MCNC: 0.7 MG/DL (ref 0.2–1.2)
BUN BLD-MCNC: 23 MG/DL (ref 8–23)
BUN/CREAT SERPL: 23.2 (ref 7–25)
BURR CELLS BLD QL SMEAR: ABNORMAL
CALCIUM SPEC-SCNC: 7.9 MG/DL (ref 8.6–10.5)
CHLORIDE SERPL-SCNC: 113 MMOL/L (ref 98–107)
CO2 SERPL-SCNC: 19 MMOL/L (ref 22–29)
CREAT BLD-MCNC: 0.99 MG/DL (ref 0.76–1.27)
DEPRECATED RDW RBC AUTO: 49.7 FL (ref 37–54)
EOSINOPHIL # BLD MANUAL: 0.02 10*3/MM3 (ref 0–0.4)
EOSINOPHIL NFR BLD MANUAL: 1 % (ref 0.3–6.2)
ERYTHROCYTE [DISTWIDTH] IN BLOOD BY AUTOMATED COUNT: 14.3 % (ref 12.3–15.4)
GFR SERPL CREATININE-BSD FRML MDRD: 89 ML/MIN/1.73
GIANT PLATELETS: ABNORMAL
GLOBULIN UR ELPH-MCNC: 4.7 GM/DL
GLUCOSE BLD-MCNC: 138 MG/DL (ref 65–99)
HCT VFR BLD AUTO: 32 % (ref 37.5–51)
HGB BLD-MCNC: 10.7 G/DL (ref 13–17.7)
HYPOCHROMIA BLD QL: ABNORMAL
LYMPHOCYTES # BLD MANUAL: 0.49 10*3/MM3 (ref 0.7–3.1)
LYMPHOCYTES NFR BLD MANUAL: 23.2 % (ref 19.6–45.3)
LYMPHOCYTES NFR BLD MANUAL: 7.1 % (ref 5–12)
MCH RBC QN AUTO: 31.8 PG (ref 26.6–33)
MCHC RBC AUTO-ENTMCNC: 33.4 G/DL (ref 31.5–35.7)
MCV RBC AUTO: 95.2 FL (ref 79–97)
MONOCYTES # BLD AUTO: 0.15 10*3/MM3 (ref 0.1–0.9)
NEUTROPHILS # BLD AUTO: 1.44 10*3/MM3 (ref 1.7–7)
NEUTROPHILS NFR BLD MANUAL: 68.7 % (ref 42.7–76)
NRBC SPEC MANUAL: 1 /100 WBC (ref 0–0.2)
PLATELET # BLD AUTO: 120 10*3/MM3 (ref 140–450)
PMV BLD AUTO: 12.7 FL (ref 6–12)
POIKILOCYTOSIS BLD QL SMEAR: ABNORMAL
POTASSIUM BLD-SCNC: 3.7 MMOL/L (ref 3.5–5.2)
PROT SERPL-MCNC: 7.8 G/DL (ref 6–8.5)
RBC # BLD AUTO: 3.36 10*6/MM3 (ref 4.14–5.8)
SMUDGE CELLS BLD QL SMEAR: ABNORMAL
SODIUM BLD-SCNC: 145 MMOL/L (ref 136–145)
TARGETS BLD QL SMEAR: ABNORMAL
TSH SERPL DL<=0.05 MIU/L-ACNC: 4.95 UIU/ML (ref 0.27–4.2)
WBC NRBC COR # BLD: 2.1 10*3/MM3 (ref 3.4–10.8)

## 2019-01-01 PROCEDURE — 85007 BL SMEAR W/DIFF WBC COUNT: CPT | Performed by: NURSE PRACTITIONER

## 2019-01-01 PROCEDURE — 80053 COMPREHEN METABOLIC PANEL: CPT | Performed by: NURSE PRACTITIONER

## 2019-01-01 PROCEDURE — 84443 ASSAY THYROID STIM HORMONE: CPT | Performed by: NURSE PRACTITIONER

## 2019-01-01 PROCEDURE — 99213 OFFICE O/P EST LOW 20 MIN: CPT | Performed by: NURSE PRACTITIONER

## 2019-01-01 PROCEDURE — 85025 COMPLETE CBC W/AUTO DIFF WBC: CPT | Performed by: NURSE PRACTITIONER

## 2019-01-01 RX ORDER — FAMOTIDINE 40 MG/1
40 TABLET, FILM COATED ORAL DAILY
Qty: 90 TABLET | Refills: 3 | Status: SHIPPED | OUTPATIENT
Start: 2019-01-01

## 2019-01-01 RX ORDER — LEVOTHYROXINE SODIUM 0.03 MG/1
25 TABLET ORAL DAILY
Qty: 90 TABLET | Refills: 3 | Status: SHIPPED | OUTPATIENT
Start: 2019-01-01 | End: 2020-01-01 | Stop reason: DRUGHIGH

## 2019-01-01 RX ORDER — RAMIPRIL 5 MG/1
CAPSULE ORAL
Qty: 90 CAPSULE | Refills: 1 | Status: SHIPPED | OUTPATIENT
Start: 2019-01-01 | End: 2020-01-01

## 2019-01-03 DIAGNOSIS — N40.1 BENIGN PROSTATIC HYPERPLASIA WITH LOWER URINARY TRACT SYMPTOMS, SYMPTOM DETAILS UNSPECIFIED: ICD-10-CM

## 2019-01-03 DIAGNOSIS — I63.9 CEREBROVASCULAR ACCIDENT (CVA), UNSPECIFIED MECHANISM (HCC): ICD-10-CM

## 2019-01-03 DIAGNOSIS — I10 ESSENTIAL HYPERTENSION: ICD-10-CM

## 2019-01-03 RX ORDER — RANITIDINE 150 MG/1
TABLET ORAL
Qty: 180 TABLET | Refills: 1 | Status: SHIPPED | OUTPATIENT
Start: 2019-01-03 | End: 2019-01-25 | Stop reason: SDUPTHER

## 2019-01-03 RX ORDER — TAMSULOSIN HYDROCHLORIDE 0.4 MG/1
2 CAPSULE ORAL NIGHTLY
Qty: 180 CAPSULE | Refills: 1 | Status: SHIPPED | OUTPATIENT
Start: 2019-01-03 | End: 2019-01-25 | Stop reason: SDUPTHER

## 2019-01-03 RX ORDER — AMLODIPINE BESYLATE 5 MG/1
TABLET ORAL
Qty: 90 TABLET | Refills: 1 | Status: SHIPPED | OUTPATIENT
Start: 2019-01-03 | End: 2019-01-29 | Stop reason: SDUPTHER

## 2019-01-03 RX ORDER — CLOPIDOGREL BISULFATE 75 MG/1
TABLET ORAL
Qty: 90 TABLET | Refills: 1 | Status: SHIPPED | OUTPATIENT
Start: 2019-01-03 | End: 2019-01-25 | Stop reason: SDUPTHER

## 2019-01-03 RX ORDER — TAMSULOSIN HYDROCHLORIDE 0.4 MG/1
CAPSULE ORAL
Qty: 180 CAPSULE | Refills: 1 | Status: SHIPPED | OUTPATIENT
Start: 2019-01-03 | End: 2019-01-03 | Stop reason: SDUPTHER

## 2019-01-24 ENCOUNTER — OFFICE VISIT (OUTPATIENT)
Dept: FAMILY MEDICINE CLINIC | Facility: CLINIC | Age: 74
End: 2019-01-24

## 2019-01-24 VITALS
TEMPERATURE: 96.9 F | HEIGHT: 68 IN | BODY MASS INDEX: 20 KG/M2 | WEIGHT: 132 LBS | DIASTOLIC BLOOD PRESSURE: 89 MMHG | HEART RATE: 85 BPM | OXYGEN SATURATION: 99 % | RESPIRATION RATE: 20 BRPM | SYSTOLIC BLOOD PRESSURE: 145 MMHG

## 2019-01-24 DIAGNOSIS — B35.3 TINEA PEDIS OF BOTH FEET: ICD-10-CM

## 2019-01-24 DIAGNOSIS — N40.1 BENIGN PROSTATIC HYPERPLASIA WITH LOWER URINARY TRACT SYMPTOMS, SYMPTOM DETAILS UNSPECIFIED: ICD-10-CM

## 2019-01-24 DIAGNOSIS — I63.9 CEREBROVASCULAR ACCIDENT (CVA), UNSPECIFIED MECHANISM (HCC): ICD-10-CM

## 2019-01-24 DIAGNOSIS — F03.91 DEMENTIA WITH BEHAVIORAL DISTURBANCE, UNSPECIFIED DEMENTIA TYPE: ICD-10-CM

## 2019-01-24 DIAGNOSIS — I10 ESSENTIAL HYPERTENSION: Primary | ICD-10-CM

## 2019-01-24 PROCEDURE — 99214 OFFICE O/P EST MOD 30 MIN: CPT | Performed by: NURSE PRACTITIONER

## 2019-01-24 RX ORDER — CLOPIDOGREL BISULFATE 75 MG/1
75 TABLET ORAL DAILY
Qty: 90 TABLET | Refills: 1 | Status: SHIPPED | OUTPATIENT
Start: 2019-01-24 | End: 2019-05-20 | Stop reason: SDUPTHER

## 2019-01-24 RX ORDER — RAMIPRIL 5 MG/1
5 CAPSULE ORAL DAILY
Qty: 90 CAPSULE | Refills: 1 | Status: SHIPPED | OUTPATIENT
Start: 2019-01-24 | End: 2019-01-28 | Stop reason: SDUPTHER

## 2019-01-24 RX ORDER — TAMSULOSIN HYDROCHLORIDE 0.4 MG/1
2 CAPSULE ORAL NIGHTLY
Qty: 180 CAPSULE | Refills: 1 | Status: SHIPPED | OUTPATIENT
Start: 2019-01-24 | End: 2019-05-20 | Stop reason: SDUPTHER

## 2019-01-24 RX ORDER — RANITIDINE 150 MG/1
150 TABLET ORAL 2 TIMES DAILY
Qty: 180 TABLET | Refills: 1 | Status: SHIPPED | OUTPATIENT
Start: 2019-01-24 | End: 2019-05-20 | Stop reason: SDUPTHER

## 2019-01-24 RX ORDER — ASPIRIN 81 MG/1
81 TABLET, CHEWABLE ORAL DAILY
Qty: 90 TABLET | Refills: 1 | Status: SHIPPED | OUTPATIENT
Start: 2019-01-24

## 2019-01-24 RX ORDER — AMLODIPINE BESYLATE 5 MG/1
5 TABLET ORAL DAILY
Qty: 90 TABLET | Refills: 1 | Status: SHIPPED | OUTPATIENT
Start: 2019-01-24 | End: 2019-05-20 | Stop reason: SDUPTHER

## 2019-01-24 RX ORDER — MEMANTINE HYDROCHLORIDE 5 MG/1
5 TABLET ORAL 2 TIMES DAILY
Qty: 180 TABLET | Refills: 1 | Status: SHIPPED | OUTPATIENT
Start: 2019-01-24 | End: 2020-01-01

## 2019-01-24 NOTE — PROGRESS NOTES
Subjective   Jonnathan Albert is a 73 y.o. male.     Here today for yohana and management of his b/p.  He cont to have problems with dementia.  He refuses to go back to the neurologist.  He has discoloration and itchy rash on his feet.  He has had for quite sometime and his wife reports he wears socks all the time.      Hypertension   This is a chronic problem. The current episode started more than 1 year ago. The problem has been waxing and waning since onset. The problem is controlled. Associated symptoms include anxiety. Pertinent negatives include no blurred vision, chest pain, headaches, malaise/fatigue, neck pain, orthopnea, palpitations, peripheral edema, PND, shortness of breath or sweats. There are no associated agents to hypertension. Risk factors for coronary artery disease include male gender and sedentary lifestyle. Past treatments include ACE inhibitors and calcium channel blockers. Current antihypertension treatment includes ACE inhibitors and calcium channel blockers. The current treatment provides significant improvement. Compliance problems include psychosocial issues.  There is no history of angina, kidney disease, CAD/MI, CVA, heart failure, left ventricular hypertrophy, PVD or retinopathy.   Dementia   This is a chronic problem. The current episode started more than 1 year ago. The problem occurs constantly. The problem has been gradually worsening. Associated symptoms include a rash. Pertinent negatives include no abdominal pain, anorexia, arthralgias, change in bowel habit, chest pain, chills, congestion, coughing, diaphoresis, fatigue, fever, headaches, joint swelling, myalgias, nausea, neck pain, numbness, sore throat, swollen glands, urinary symptoms, vertigo, visual change, vomiting or weakness. Nothing aggravates the symptoms. Treatments tried: presently on namenda. The treatment provided mild relief.   Rash   This is a new problem. The current episode started more than 1 month ago. The  problem has been gradually worsening since onset. The affected locations include the left foot and right foot. The rash is characterized by itchiness and scaling. He was exposed to nothing. Pertinent negatives include no anorexia, congestion, cough, fatigue, fever, shortness of breath, sore throat or vomiting. Past treatments include moisturizer. The treatment provided no relief.        The following portions of the patient's history were reviewed and updated as appropriate: allergies, current medications, past family history, past medical history, past social history, past surgical history and problem list.    Review of Systems   Constitutional: Negative.  Negative for chills, diaphoresis, fatigue, fever and malaise/fatigue.   HENT: Negative.  Negative for congestion and sore throat.    Eyes: Negative for blurred vision.   Respiratory: Negative.  Negative for cough and shortness of breath.    Cardiovascular: Negative.  Negative for chest pain, palpitations, orthopnea and PND.   Gastrointestinal: Negative for abdominal pain, anorexia, change in bowel habit, nausea and vomiting.   Musculoskeletal: Negative.  Negative for arthralgias, joint swelling, myalgias and neck pain.   Skin: Positive for rash.   Neurological: Negative.  Negative for vertigo, weakness and numbness.   Psychiatric/Behavioral: Negative.        Objective   Physical Exam   Constitutional: He is oriented to person, place, and time. He appears well-developed and well-nourished. No distress.   HENT:   Head: Normocephalic and atraumatic.   Right Ear: External ear normal.   Left Ear: External ear normal.   Nose: Nose normal.   Mouth/Throat: Oropharynx is clear and moist. No oropharyngeal exudate.   Eyes: Pupils are equal, round, and reactive to light.   Neck: Normal range of motion. Neck supple. No thyromegaly present.   Cardiovascular: Normal rate, regular rhythm and normal heart sounds. Exam reveals no friction rub.   No murmur heard.  Pulmonary/Chest:  Effort normal and breath sounds normal. No stridor. No respiratory distress. He has no wheezes. He has no rales.   Abdominal: Soft.   Musculoskeletal: Normal range of motion.   Neurological: He is alert and oriented to person, place, and time.   Can answer questions appropriately    Skin: Skin is warm and dry.   Has extremely scaly discolored skin on both feet.  There are no open areas, pustules or vesicles.   Nursing note and vitals reviewed.        Assessment/Plan   Jonnathan was seen today for hypertension and dementia.    Diagnoses and all orders for this visit:    Essential hypertension  -     ramipril (ALTACE) 5 MG capsule; Take 1 capsule by mouth Daily.  -     aspirin 81 MG chewable tablet; Chew 1 tablet Daily.  -     amLODIPine (NORVASC) 5 MG tablet; Take 1 tablet by mouth Daily.    Tinea pedis of both feet  -     miconazole (LOTRIMIN AF) 2 % powder; Apply  topically to the appropriate area as directed As Needed for Itching.    Dementia with behavioral disturbance, unspecified dementia type    Benign prostatic hyperplasia with lower urinary tract symptoms, symptom details unspecified  -     tamsulosin (FLOMAX) 0.4 MG capsule 24 hr capsule; Take 2 capsules by mouth Every Night.    Cerebrovascular accident (CVA), unspecified mechanism (CMS/HCC)  -     clopidogrel (PLAVIX) 75 MG tablet; Take 1 tablet by mouth Daily.    Other orders  -     memantine (NAMENDA) 5 MG tablet; Take 1 tablet by mouth 2 (Two) Times a Day.  -     raNITIdine (ZANTAC) 150 MG tablet; Take 1 tablet by mouth 2 (Two) Times a Day.      He refuses to have labs done today.

## 2019-01-28 DIAGNOSIS — I10 ESSENTIAL HYPERTENSION: ICD-10-CM

## 2019-01-28 RX ORDER — RAMIPRIL 5 MG/1
CAPSULE ORAL
Qty: 30 CAPSULE | Refills: 0 | Status: SHIPPED | OUTPATIENT
Start: 2019-01-28 | End: 2019-01-29 | Stop reason: SDUPTHER

## 2019-01-29 DIAGNOSIS — I10 ESSENTIAL HYPERTENSION: ICD-10-CM

## 2019-02-04 RX ORDER — RAMIPRIL 5 MG/1
5 CAPSULE ORAL DAILY
Qty: 30 CAPSULE | Refills: 3 | OUTPATIENT
Start: 2019-02-04 | End: 2019-05-20 | Stop reason: SDUPTHER

## 2019-02-04 RX ORDER — AMLODIPINE BESYLATE 5 MG/1
5 TABLET ORAL DAILY
Qty: 90 TABLET | Refills: 1 | OUTPATIENT
Start: 2019-02-04 | End: 2019-05-20 | Stop reason: SDUPTHER

## 2019-05-20 ENCOUNTER — OFFICE VISIT (OUTPATIENT)
Dept: FAMILY MEDICINE CLINIC | Facility: CLINIC | Age: 74
End: 2019-05-20

## 2019-05-20 VITALS
DIASTOLIC BLOOD PRESSURE: 80 MMHG | TEMPERATURE: 98.6 F | RESPIRATION RATE: 20 BRPM | WEIGHT: 128 LBS | BODY MASS INDEX: 19.4 KG/M2 | HEART RATE: 101 BPM | OXYGEN SATURATION: 98 % | HEIGHT: 68 IN | SYSTOLIC BLOOD PRESSURE: 110 MMHG

## 2019-05-20 DIAGNOSIS — I63.9 CEREBROVASCULAR ACCIDENT (CVA), UNSPECIFIED MECHANISM (HCC): ICD-10-CM

## 2019-05-20 DIAGNOSIS — I10 ESSENTIAL HYPERTENSION: ICD-10-CM

## 2019-05-20 DIAGNOSIS — N40.1 BENIGN PROSTATIC HYPERPLASIA WITH LOWER URINARY TRACT SYMPTOMS, SYMPTOM DETAILS UNSPECIFIED: ICD-10-CM

## 2019-05-20 DIAGNOSIS — F03.91 DEMENTIA WITH BEHAVIORAL DISTURBANCE, UNSPECIFIED DEMENTIA TYPE: Primary | ICD-10-CM

## 2019-05-20 DIAGNOSIS — R63.4 WEIGHT LOSS: ICD-10-CM

## 2019-05-20 PROCEDURE — 99214 OFFICE O/P EST MOD 30 MIN: CPT | Performed by: NURSE PRACTITIONER

## 2019-05-20 RX ORDER — CLOPIDOGREL BISULFATE 75 MG/1
75 TABLET ORAL DAILY
Qty: 90 TABLET | Refills: 1 | Status: SHIPPED | OUTPATIENT
Start: 2019-05-20

## 2019-05-20 RX ORDER — RAMIPRIL 5 MG/1
5 CAPSULE ORAL DAILY
Qty: 30 CAPSULE | Refills: 3 | OUTPATIENT
Start: 2019-05-20 | End: 2020-01-01

## 2019-05-20 RX ORDER — TAMSULOSIN HYDROCHLORIDE 0.4 MG/1
2 CAPSULE ORAL NIGHTLY
Qty: 180 CAPSULE | Refills: 1 | Status: SHIPPED | OUTPATIENT
Start: 2019-05-20 | End: 2020-01-01 | Stop reason: SDUPTHER

## 2019-05-20 RX ORDER — AMLODIPINE BESYLATE 5 MG/1
5 TABLET ORAL DAILY
Qty: 90 TABLET | Refills: 1 | Status: SHIPPED | OUTPATIENT
Start: 2019-05-20 | End: 2020-01-01

## 2019-05-20 RX ORDER — MEGESTROL ACETATE 20 MG/1
20 TABLET ORAL 2 TIMES DAILY
Qty: 60 TABLET | Refills: 3 | Status: SHIPPED | OUTPATIENT
Start: 2019-05-20 | End: 2020-01-01

## 2019-05-20 RX ORDER — RANITIDINE 150 MG/1
150 TABLET ORAL 2 TIMES DAILY
Qty: 180 TABLET | Refills: 1 | Status: SHIPPED | OUTPATIENT
Start: 2019-05-20 | End: 2019-01-01 | Stop reason: ALTCHOICE

## 2019-05-22 NOTE — PROGRESS NOTES
Subjective   Jonnathan Albert is a 74 y.o. male.     Mr Albert is brought in by his wife for a court ordered evaluation.  She is seeking guardianship over Mr. Albert.  He suffers from dementia that seems to be worsening.  He has seen neurology in the past and was placed on namenda for the dementia.  Mr. Albert refuses to take this med along with other meds.  She also has difficulty getting him to eat.  He becomes somewhat combative and belligerent at times.  She continues to care for him at home, but this seems to be getting harder and harder for her to do alone.      Dementia   This is a chronic problem. The current episode started more than 1 year ago. The problem occurs constantly. The problem has been gradually worsening. Associated symptoms include anorexia. Pertinent negatives include no abdominal pain, arthralgias, change in bowel habit, chest pain, chills, congestion, coughing, diaphoresis, fatigue, fever, headaches, joint swelling, myalgias, nausea, neck pain, numbness, rash, sore throat, swollen glands, urinary symptoms, vertigo, visual change, vomiting or weakness. Nothing aggravates the symptoms. Treatments tried: has been on namenda, but he refuses to take. The treatment provided no relief.   Hypertension   This is a chronic problem. The current episode started more than 1 year ago. The problem is controlled. Pertinent negatives include no anxiety, blurred vision, chest pain, headaches, malaise/fatigue, neck pain, orthopnea, palpitations, peripheral edema, PND, shortness of breath or sweats. There are no associated agents to hypertension. Risk factors for coronary artery disease include sedentary lifestyle and male gender. Past treatments include calcium channel blockers. Current antihypertension treatment includes calcium channel blockers. The current treatment provides significant improvement. Compliance problems include psychosocial issues.  There is no history of angina, kidney disease, CAD/MI, CVA,  heart failure, left ventricular hypertrophy, PVD or retinopathy.        The following portions of the patient's history were reviewed and updated as appropriate: allergies, current medications, past family history, past medical history, past social history, past surgical history and problem list.    Review of Systems   Constitutional: Negative.  Negative for chills, diaphoresis, fatigue, fever and malaise/fatigue.   HENT: Negative.  Negative for congestion and sore throat.    Eyes: Negative.  Negative for blurred vision.   Respiratory: Negative.  Negative for cough and shortness of breath.    Cardiovascular: Negative.  Negative for chest pain, palpitations, orthopnea and PND.   Gastrointestinal: Positive for anorexia. Negative for abdominal pain, change in bowel habit, nausea and vomiting.   Endocrine: Negative.    Genitourinary: Negative.    Musculoskeletal: Negative.  Negative for arthralgias, joint swelling, myalgias and neck pain.   Skin: Negative.  Negative for rash.   Allergic/Immunologic: Negative.    Neurological: Positive for memory problem. Negative for vertigo, weakness and numbness.   Hematological: Negative.    Psychiatric/Behavioral: Positive for agitation and behavioral problems.       Objective   Physical Exam   Constitutional: He appears well-developed and well-nourished. No distress.   HENT:   Head: Normocephalic and atraumatic.   Right Ear: External ear normal.   Left Ear: External ear normal.   Nose: Nose normal.   Mouth/Throat: Oropharynx is clear and moist. No oropharyngeal exudate.   Eyes: Pupils are equal, round, and reactive to light.   Neck: Normal range of motion. Neck supple. No thyromegaly present.   Cardiovascular: Normal rate, regular rhythm and normal heart sounds. Exam reveals no friction rub.   No murmur heard.  Pulmonary/Chest: Effort normal and breath sounds normal. No respiratory distress. He has no wheezes. He has no rales.   Abdominal: Soft.   Musculoskeletal: Normal range of  motion.   Neurological: He is alert.   Not really sure he understands where he is and what her is here today for.   Skin: Skin is warm and dry.   Psychiatric: His affect is blunt and inappropriate. He is agitated and aggressive. Cognition and memory are impaired. He is noncommunicative.   He becomes argumentative with his wife.  Behavior can be described at times as being aggressive.   Nursing note and vitals reviewed.        Assessment/Plan   Jonnathan was seen today for annual exam.    Diagnoses and all orders for this visit:    Dementia with behavioral disturbance, unspecified dementia type    Weight loss  -     megestrol (MEGACE) 20 MG tablet; Take 1 tablet by mouth 2 (Two) Times a Day.    Benign prostatic hyperplasia with lower urinary tract symptoms, symptom details unspecified  -     tamsulosin (FLOMAX) 0.4 MG capsule 24 hr capsule; Take 2 capsules by mouth Every Night.    Essential hypertension  -     amLODIPine (NORVASC) 5 MG tablet; Take 1 tablet by mouth Daily.  -     ramipril (ALTACE) 5 MG capsule; Take 1 capsule by mouth Daily.    Cerebrovascular accident (CVA), unspecified mechanism (CMS/HCC)  -     clopidogrel (PLAVIX) 75 MG tablet; Take 1 tablet by mouth Daily.    Other orders  -     raNITIdine (ZANTAC) 150 MG tablet; Take 1 tablet by mouth 2 (Two) Times a Day.      Have added megace in an effort to increase his appetite.  Wife is doubtful he will take it.  She wishes to keep him at home as long as she can.   But placement in the future may be necessary.

## 2019-07-02 DIAGNOSIS — I10 ESSENTIAL HYPERTENSION: ICD-10-CM

## 2019-07-02 RX ORDER — RAMIPRIL 5 MG/1
CAPSULE ORAL
Qty: 90 CAPSULE | Refills: 1 | Status: SHIPPED | OUTPATIENT
Start: 2019-07-02 | End: 2020-01-01

## 2019-11-14 NOTE — TELEPHONE ENCOUNTER
Spoke with patient advised she can call up to [facility] and they have a list of sitters

## 2019-11-14 NOTE — TELEPHONE ENCOUNTER
Ms. Albert would like a call back [phone]. She is wanting to know the name of someone or service that can come in and help her with Mr. Albert. She states that his dementia is getting worse.

## 2019-11-21 NOTE — TELEPHONE ENCOUNTER
Patient wife states that he was previously taking ranitidine but has stopped since recall on medication.     Wife would like to know if anything could be recommended for catina to take in replace for the acid reflux relieve.

## 2019-11-25 NOTE — PROGRESS NOTES
Subjective   Jonnathan Albert is a 74 y.o. male.     Brought in today by his wife for [employer].  Ms Albert reports that his dementia seems to be getting worse.  However his wife has hired someone to help her and she feels that she is still able to care for him at home.  His appetite has picked up somewhat and she says he is eating better.  He needs to have some labs drawn.      Dementia   This is a chronic problem. The current episode started more than 1 year ago. The problem occurs constantly. The problem has been gradually worsening. Associated symptoms include fatigue and weakness. Pertinent negatives include no abdominal pain, anorexia, arthralgias, change in bowel habit, chest pain, chills, congestion, coughing, diaphoresis, fever, headaches, joint swelling, myalgias, nausea, neck pain, numbness, rash, sore throat, swollen glands, urinary symptoms, vertigo, visual change or vomiting. Nothing aggravates the symptoms. Treatments tried: is curretnly taking namenda. The treatment provided mild relief.   Hypertension   The current episode started more than 1 year ago. The problem is unchanged. The problem is controlled. Associated symptoms include anxiety and malaise/fatigue. Pertinent negatives include no blurred vision, chest pain, headaches, neck pain, orthopnea, palpitations, peripheral edema, PND, shortness of breath or sweats. There are no associated agents to hypertension. Risk factors for coronary artery disease include diabetes mellitus, male gender and sedentary lifestyle. Past treatments include calcium channel blockers and ACE inhibitors. Current antihypertension treatment includes calcium channel blockers and ACE inhibitors. The current treatment provides significant improvement. There are no compliance problems.  There is no history of angina, kidney disease, CAD/MI, CVA, heart failure, left ventricular hypertrophy, PVD or retinopathy.        The following portions of the patient's history were reviewed  and updated as appropriate: allergies, current medications, past family history, past medical history, past social history, past surgical history and problem list.    Review of Systems   Constitutional: Positive for fatigue and malaise/fatigue. Negative for chills, diaphoresis and fever.   HENT: Negative.  Negative for congestion, sore throat and swollen glands.    Eyes: Negative.  Negative for blurred vision.   Respiratory: Negative.  Negative for cough and shortness of breath.    Cardiovascular: Negative.  Negative for chest pain, palpitations, orthopnea and PND.   Gastrointestinal: Negative.  Negative for abdominal pain, anorexia, change in bowel habit, nausea and vomiting.   Endocrine: Negative.    Genitourinary: Negative.    Musculoskeletal: Negative.  Negative for arthralgias, joint swelling, myalgias and neck pain.   Skin: Negative.  Negative for rash.   Allergic/Immunologic: Negative.    Neurological: Positive for weakness. Negative for vertigo and numbness.   Hematological: Negative.    Psychiatric/Behavioral: Negative.        Objective   Physical Exam   Constitutional: He appears well-developed and well-nourished. No distress.   HENT:   Head: Normocephalic and atraumatic.   Right Ear: External ear normal.   Left Ear: External ear normal.   Nose: Nose normal.   Mouth/Throat: Oropharynx is clear and moist. No oropharyngeal exudate.   Eyes: Pupils are equal, round, and reactive to light.   Neck: Normal range of motion. Neck supple. No thyromegaly present.   Cardiovascular: Normal rate, regular rhythm and normal heart sounds. Exam reveals no friction rub.   No murmur heard.  Pulmonary/Chest: Effort normal and breath sounds normal. No respiratory distress. He has no wheezes. He has no rales.   Abdominal: Soft.   Musculoskeletal: Normal range of motion.   Neurological: He is alert.   Skin: Skin is warm and dry.   Psychiatric: He has a normal mood and affect. Thought content normal.   Nursing note and vitals  reviewed.        Assessment/Plan   Jonnathan was seen today for dementia and stomach swollen.    Diagnoses and all orders for this visit:    Essential hypertension  -     Comprehensive metabolic panel    Screening for deficiency anemia  -     CBC & Differential  -     CBC Auto Differential  -     Manual Differential; Future  -     Manual Differential    Screening for thyroid disorder  -     TSH

## 2019-11-26 NOTE — TELEPHONE ENCOUNTER
PER ROSA PATIENT NEEDS TO BE ON 25 MCG OF THYROID MEDICATION [facility]

## 2019-11-26 NOTE — TELEPHONE ENCOUNTER
Patient wife is wanting to discuss lab results from 11/21.    Wife states that she received a call from a nurse who advised of some of the results and she was concerned by what she was told.     Patients wife is wanting a call to discuss her concerns in regards to lab results.

## 2020-01-01 ENCOUNTER — TELEPHONE (OUTPATIENT)
Dept: FAMILY MEDICINE CLINIC | Facility: CLINIC | Age: 75
End: 2020-01-01

## 2020-01-01 ENCOUNTER — APPOINTMENT (OUTPATIENT)
Dept: CT IMAGING | Facility: HOSPITAL | Age: 75
End: 2020-01-01

## 2020-01-01 ENCOUNTER — OFFICE VISIT (OUTPATIENT)
Dept: FAMILY MEDICINE CLINIC | Facility: CLINIC | Age: 75
End: 2020-01-01

## 2020-01-01 ENCOUNTER — APPOINTMENT (OUTPATIENT)
Dept: LAB | Facility: HOSPITAL | Age: 75
End: 2020-01-01

## 2020-01-01 ENCOUNTER — HOSPITAL ENCOUNTER (INPATIENT)
Facility: HOSPITAL | Age: 75
LOS: 2 days | Discharge: HOME HEALTH | End: 2020-03-20
Attending: EMERGENCY MEDICINE | Admitting: FAMILY MEDICINE

## 2020-01-01 ENCOUNTER — OUTSIDE FACILITY SERVICE (OUTPATIENT)
Dept: FAMILY MEDICINE CLINIC | Facility: CLINIC | Age: 75
End: 2020-01-01

## 2020-01-01 ENCOUNTER — APPOINTMENT (OUTPATIENT)
Dept: GENERAL RADIOLOGY | Facility: HOSPITAL | Age: 75
End: 2020-01-01

## 2020-01-01 ENCOUNTER — LAB (OUTPATIENT)
Dept: LAB | Facility: HOSPITAL | Age: 75
End: 2020-01-01

## 2020-01-01 ENCOUNTER — APPOINTMENT (OUTPATIENT)
Dept: ULTRASOUND IMAGING | Facility: HOSPITAL | Age: 75
End: 2020-01-01

## 2020-01-01 ENCOUNTER — OFFICE VISIT (OUTPATIENT)
Dept: GASTROENTEROLOGY | Facility: CLINIC | Age: 75
End: 2020-01-01

## 2020-01-01 ENCOUNTER — READMISSION MANAGEMENT (OUTPATIENT)
Dept: CALL CENTER | Facility: HOSPITAL | Age: 75
End: 2020-01-01

## 2020-01-01 VITALS
SYSTOLIC BLOOD PRESSURE: 113 MMHG | HEIGHT: 68 IN | BODY MASS INDEX: 20.77 KG/M2 | DIASTOLIC BLOOD PRESSURE: 93 MMHG | HEART RATE: 88 BPM

## 2020-01-01 VITALS
OXYGEN SATURATION: 94 % | HEIGHT: 68 IN | HEART RATE: 88 BPM | RESPIRATION RATE: 18 BRPM | BODY MASS INDEX: 20.7 KG/M2 | SYSTOLIC BLOOD PRESSURE: 124 MMHG | DIASTOLIC BLOOD PRESSURE: 68 MMHG | WEIGHT: 136.6 LBS | TEMPERATURE: 97.8 F

## 2020-01-01 VITALS
HEIGHT: 68 IN | SYSTOLIC BLOOD PRESSURE: 111 MMHG | BODY MASS INDEX: 22.05 KG/M2 | HEART RATE: 86 BPM | DIASTOLIC BLOOD PRESSURE: 83 MMHG

## 2020-01-01 VITALS
OXYGEN SATURATION: 97 % | HEIGHT: 68 IN | SYSTOLIC BLOOD PRESSURE: 122 MMHG | TEMPERATURE: 97.9 F | HEART RATE: 108 BPM | DIASTOLIC BLOOD PRESSURE: 78 MMHG | WEIGHT: 145 LBS | BODY MASS INDEX: 21.98 KG/M2

## 2020-01-01 VITALS
BODY MASS INDEX: 20.77 KG/M2 | HEART RATE: 112 BPM | HEIGHT: 68 IN | SYSTOLIC BLOOD PRESSURE: 80 MMHG | DIASTOLIC BLOOD PRESSURE: 46 MMHG | OXYGEN SATURATION: 98 % | TEMPERATURE: 100 F

## 2020-01-01 VITALS — BODY MASS INDEX: 20.77 KG/M2 | HEIGHT: 68 IN

## 2020-01-01 DIAGNOSIS — K74.60 CIRRHOSIS OF LIVER WITH ASCITES, UNSPECIFIED HEPATIC CIRRHOSIS TYPE (HCC): Primary | ICD-10-CM

## 2020-01-01 DIAGNOSIS — Z74.09 IMPAIRED MOBILITY AND ACTIVITIES OF DAILY LIVING: ICD-10-CM

## 2020-01-01 DIAGNOSIS — R73.01 IMPAIRED FASTING GLUCOSE: ICD-10-CM

## 2020-01-01 DIAGNOSIS — N40.0 BENIGN PROSTATIC HYPERPLASIA, UNSPECIFIED WHETHER LOWER URINARY TRACT SYMPTOMS PRESENT: ICD-10-CM

## 2020-01-01 DIAGNOSIS — K74.60 HEPATIC CIRRHOSIS, UNSPECIFIED HEPATIC CIRRHOSIS TYPE, UNSPECIFIED WHETHER ASCITES PRESENT (HCC): Primary | ICD-10-CM

## 2020-01-01 DIAGNOSIS — G31.9 CEREBRAL ATROPHY (HCC): ICD-10-CM

## 2020-01-01 DIAGNOSIS — R18.8 POORLY CONTROLLED ASCITES: Primary | ICD-10-CM

## 2020-01-01 DIAGNOSIS — R26.81 GAIT INSTABILITY: ICD-10-CM

## 2020-01-01 DIAGNOSIS — S31.000S WOUND OF SACRAL REGION, SEQUELA: ICD-10-CM

## 2020-01-01 DIAGNOSIS — R63.4 ABNORMAL WEIGHT LOSS: ICD-10-CM

## 2020-01-01 DIAGNOSIS — K74.60 CIRRHOSIS OF LIVER WITH ASCITES, UNSPECIFIED HEPATIC CIRRHOSIS TYPE (HCC): ICD-10-CM

## 2020-01-01 DIAGNOSIS — R53.81 PHYSICAL DECONDITIONING: ICD-10-CM

## 2020-01-01 DIAGNOSIS — F01.518 VASCULAR DEMENTIA WITH BEHAVIOR DISTURBANCE (HCC): ICD-10-CM

## 2020-01-01 DIAGNOSIS — Z00.00 GENERAL MEDICAL EXAMINATION: ICD-10-CM

## 2020-01-01 DIAGNOSIS — Z78.9 POOR TOLERANCE FOR AMBULATION: ICD-10-CM

## 2020-01-01 DIAGNOSIS — K74.60 CHRONIC HEPATITIS C WITH CIRRHOSIS (HCC): Chronic | ICD-10-CM

## 2020-01-01 DIAGNOSIS — Z13.1 ENCOUNTER FOR SCREENING FOR DIABETES MELLITUS: ICD-10-CM

## 2020-01-01 DIAGNOSIS — I95.9 HYPOTENSION, UNSPECIFIED HYPOTENSION TYPE: ICD-10-CM

## 2020-01-01 DIAGNOSIS — R18.8 CIRRHOSIS OF LIVER WITH ASCITES, UNSPECIFIED HEPATIC CIRRHOSIS TYPE (HCC): ICD-10-CM

## 2020-01-01 DIAGNOSIS — B18.2 CHRONIC HEPATITIS C WITH CIRRHOSIS (HCC): Chronic | ICD-10-CM

## 2020-01-01 DIAGNOSIS — I25.10 CORONARY ARTERY DISEASE INVOLVING NATIVE CORONARY ARTERY OF NATIVE HEART WITHOUT ANGINA PECTORIS: ICD-10-CM

## 2020-01-01 DIAGNOSIS — E03.9 HYPOTHYROIDISM, UNSPECIFIED TYPE: ICD-10-CM

## 2020-01-01 DIAGNOSIS — F01.518 VASCULAR DEMENTIA WITH BEHAVIOR DISTURBANCE (HCC): Primary | ICD-10-CM

## 2020-01-01 DIAGNOSIS — E55.9 VITAMIN D DEFICIENCY: ICD-10-CM

## 2020-01-01 DIAGNOSIS — R10.9 ABDOMINAL PAIN, UNSPECIFIED ABDOMINAL LOCATION: ICD-10-CM

## 2020-01-01 DIAGNOSIS — L89.90 PRESSURE INJURY OF SKIN, UNSPECIFIED INJURY STAGE, UNSPECIFIED LOCATION: Primary | ICD-10-CM

## 2020-01-01 DIAGNOSIS — I63.9 CEREBROVASCULAR ACCIDENT (CVA), UNSPECIFIED MECHANISM (HCC): ICD-10-CM

## 2020-01-01 DIAGNOSIS — F01.50 VASCULAR DEMENTIA WITHOUT BEHAVIORAL DISTURBANCE (HCC): ICD-10-CM

## 2020-01-01 DIAGNOSIS — Z86.19 HISTORY OF HEPATITIS C: ICD-10-CM

## 2020-01-01 DIAGNOSIS — Z13.6 ENCOUNTER FOR SCREENING FOR CARDIOVASCULAR DISORDERS: ICD-10-CM

## 2020-01-01 DIAGNOSIS — E43 SEVERE MALNUTRITION (HCC): ICD-10-CM

## 2020-01-01 DIAGNOSIS — R19.07 ABDOMINAL SWELLING, GENERALIZED: ICD-10-CM

## 2020-01-01 DIAGNOSIS — Z51.5 HOSPICE CARE: Primary | ICD-10-CM

## 2020-01-01 DIAGNOSIS — Z91.81 AT HIGH RISK FOR FALLS: ICD-10-CM

## 2020-01-01 DIAGNOSIS — N40.1 BENIGN PROSTATIC HYPERPLASIA WITH LOWER URINARY TRACT SYMPTOMS, SYMPTOM DETAILS UNSPECIFIED: ICD-10-CM

## 2020-01-01 DIAGNOSIS — I10 ESSENTIAL HYPERTENSION: ICD-10-CM

## 2020-01-01 DIAGNOSIS — K21.9 GASTROESOPHAGEAL REFLUX DISEASE, ESOPHAGITIS PRESENCE NOT SPECIFIED: ICD-10-CM

## 2020-01-01 DIAGNOSIS — R79.9 ABNORMAL FINDING OF BLOOD CHEMISTRY, UNSPECIFIED: ICD-10-CM

## 2020-01-01 DIAGNOSIS — R18.8 CIRRHOSIS OF LIVER WITH ASCITES, UNSPECIFIED HEPATIC CIRRHOSIS TYPE (HCC): Primary | ICD-10-CM

## 2020-01-01 DIAGNOSIS — Z00.00 ANNUAL PHYSICAL EXAM: ICD-10-CM

## 2020-01-01 DIAGNOSIS — R10.84 GENERALIZED ABDOMINAL PAIN: Primary | ICD-10-CM

## 2020-01-01 DIAGNOSIS — E55.9 VITAMIN D DEFICIENCY: Primary | ICD-10-CM

## 2020-01-01 DIAGNOSIS — Z78.9 IMPAIRED MOBILITY AND ACTIVITIES OF DAILY LIVING: ICD-10-CM

## 2020-01-01 DIAGNOSIS — R18.8 OTHER ASCITES: ICD-10-CM

## 2020-01-01 DIAGNOSIS — H61.23 BILATERAL IMPACTED CERUMEN: ICD-10-CM

## 2020-01-01 DIAGNOSIS — E03.9 ACQUIRED HYPOTHYROIDISM: ICD-10-CM

## 2020-01-01 DIAGNOSIS — F10.27 DEMENTIA ASSOCIATED WITH ALCOHOLISM WITH BEHAVIORAL DISTURBANCE (HCC): Primary | ICD-10-CM

## 2020-01-01 DIAGNOSIS — R10.84 GENERALIZED ABDOMINAL PAIN: ICD-10-CM

## 2020-01-01 DIAGNOSIS — Z74.09 IMPAIRED FUNCTIONAL MOBILITY, BALANCE, GAIT, AND ENDURANCE: ICD-10-CM

## 2020-01-01 DIAGNOSIS — H91.93 BILATERAL HEARING LOSS, UNSPECIFIED HEARING LOSS TYPE: ICD-10-CM

## 2020-01-01 LAB
25(OH)D3 SERPL-MCNC: 29.3 NG/ML (ref 30–100)
ALBUMIN FLD-MCNC: 1.5 G/DL
ALBUMIN SERPL-MCNC: 2.4 G/DL (ref 3.5–5.2)
ALBUMIN SERPL-MCNC: 2.6 G/DL (ref 3.5–5.2)
ALBUMIN SERPL-MCNC: 2.6 G/DL (ref 3.5–5.2)
ALBUMIN SERPL-MCNC: 2.9 G/DL (ref 3.5–5.2)
ALBUMIN/GLOB SERPL: 0.5 G/DL
ALBUMIN/GLOB SERPL: 0.6 G/DL
ALBUMIN/GLOB SERPL: 0.6 G/DL
ALBUMIN/GLOB SERPL: 0.7 G/DL
ALP SERPL-CCNC: 36 U/L (ref 39–117)
ALP SERPL-CCNC: 39 U/L (ref 39–117)
ALP SERPL-CCNC: 42 U/L (ref 39–117)
ALP SERPL-CCNC: 47 U/L (ref 39–117)
ALT SERPL W P-5'-P-CCNC: 7 U/L (ref 1–41)
ALT SERPL W P-5'-P-CCNC: 8 U/L (ref 1–41)
ALT SERPL W P-5'-P-CCNC: 8 U/L (ref 1–41)
ALT SERPL W P-5'-P-CCNC: 9 U/L (ref 1–41)
AMMONIA BLD-SCNC: 14 UMOL/L (ref 16–60)
AMYLASE FLD-CCNC: 31 U/L
AMYLASE SERPL-CCNC: 68 U/L (ref 28–100)
ANION GAP SERPL CALCULATED.3IONS-SCNC: 10.4 MMOL/L (ref 5–15)
ANION GAP SERPL CALCULATED.3IONS-SCNC: 11 MMOL/L (ref 5–15)
ANISOCYTOSIS BLD QL: ABNORMAL
APPEARANCE FLD: ABNORMAL
APTT PPP: 28.2 SECONDS (ref 20–40.3)
AST SERPL-CCNC: 20 U/L (ref 1–40)
AST SERPL-CCNC: 21 U/L (ref 1–40)
AST SERPL-CCNC: 27 U/L (ref 1–40)
AST SERPL-CCNC: 27 U/L (ref 1–40)
BACTERIA FLD CULT: NORMAL
BACTERIA SPEC ANAEROBE CULT: NORMAL
BACTERIA UR QL AUTO: ABNORMAL /HPF
BASOPHILS # BLD AUTO: 0 10*3/MM3 (ref 0–0.2)
BASOPHILS # BLD AUTO: 0.01 10*3/MM3 (ref 0–0.2)
BASOPHILS NFR BLD AUTO: 0 % (ref 0–1.5)
BASOPHILS NFR BLD AUTO: 0.4 % (ref 0–1.5)
BILIRUB SERPL-MCNC: 0.3 MG/DL (ref 0.2–1.2)
BILIRUB SERPL-MCNC: 0.4 MG/DL (ref 0.2–1.2)
BILIRUB SERPL-MCNC: 0.5 MG/DL (ref 0.2–1.2)
BILIRUB SERPL-MCNC: 0.6 MG/DL (ref 0.2–1.2)
BILIRUB UR QL STRIP: ABNORMAL
BUN BLD-MCNC: 15 MG/DL (ref 8–23)
BUN BLD-MCNC: 17 MG/DL (ref 8–23)
BUN BLD-MCNC: 18 MG/DL (ref 8–23)
BUN BLD-MCNC: 19 MG/DL (ref 8–23)
BUN/CREAT SERPL: 16.4 (ref 7–25)
BUN/CREAT SERPL: 16.5 (ref 7–25)
BUN/CREAT SERPL: 17.2 (ref 7–25)
BUN/CREAT SERPL: 18.6 (ref 7–25)
CALCIUM SPEC-SCNC: 7.7 MG/DL (ref 8.6–10.5)
CALCIUM SPEC-SCNC: 7.9 MG/DL (ref 8.6–10.5)
CALCIUM SPEC-SCNC: 8 MG/DL (ref 8.6–10.5)
CALCIUM SPEC-SCNC: 8 MG/DL (ref 8.6–10.5)
CHLORIDE SERPL-SCNC: 107 MMOL/L (ref 98–107)
CHLORIDE SERPL-SCNC: 108 MMOL/L (ref 98–107)
CHLORIDE SERPL-SCNC: 109 MMOL/L (ref 98–107)
CHLORIDE SERPL-SCNC: 109 MMOL/L (ref 98–107)
CHOLEST SERPL-MCNC: 75 MG/DL (ref 0–200)
CLARITY UR: CLEAR
CO2 SERPL-SCNC: 18 MMOL/L (ref 22–29)
CO2 SERPL-SCNC: 18.6 MMOL/L (ref 22–29)
CO2 SERPL-SCNC: 21 MMOL/L (ref 22–29)
CO2 SERPL-SCNC: 21 MMOL/L (ref 22–29)
COLOR FLD: YELLOW
COLOR UR: ABNORMAL
CREAT BLD-MCNC: 0.91 MG/DL (ref 0.76–1.27)
CREAT BLD-MCNC: 0.99 MG/DL (ref 0.76–1.27)
CREAT BLD-MCNC: 1.02 MG/DL (ref 0.76–1.27)
CREAT BLD-MCNC: 1.1 MG/DL (ref 0.76–1.27)
D-LACTATE SERPL-SCNC: 1.2 MMOL/L (ref 0.5–2)
DEPRECATED RDW RBC AUTO: 49.1 FL (ref 37–54)
DEPRECATED RDW RBC AUTO: 50.7 FL (ref 37–54)
DEPRECATED RDW RBC AUTO: 52.2 FL (ref 37–54)
DEPRECATED RDW RBC AUTO: 52.5 FL (ref 37–54)
DEPRECATED RDW RBC AUTO: 53 FL (ref 37–54)
EOSINOPHIL # BLD AUTO: 0.04 10*3/MM3 (ref 0–0.4)
EOSINOPHIL # BLD AUTO: 0.07 10*3/MM3 (ref 0–0.4)
EOSINOPHIL # BLD AUTO: 0.07 10*3/MM3 (ref 0–0.4)
EOSINOPHIL # BLD AUTO: 0.08 10*3/MM3 (ref 0–0.4)
EOSINOPHIL # BLD MANUAL: 0.04 10*3/MM3 (ref 0–0.4)
EOSINOPHIL NFR BLD AUTO: 2.1 % (ref 0.3–6.2)
EOSINOPHIL NFR BLD AUTO: 3.6 % (ref 0.3–6.2)
EOSINOPHIL NFR BLD AUTO: 3.6 % (ref 0.3–6.2)
EOSINOPHIL NFR BLD AUTO: 4 % (ref 0.3–6.2)
EOSINOPHIL NFR BLD MANUAL: 2 % (ref 0.3–6.2)
ERYTHROCYTE [DISTWIDTH] IN BLOOD BY AUTOMATED COUNT: 13.9 % (ref 12.3–15.4)
ERYTHROCYTE [DISTWIDTH] IN BLOOD BY AUTOMATED COUNT: 15.3 % (ref 12.3–15.4)
ERYTHROCYTE [DISTWIDTH] IN BLOOD BY AUTOMATED COUNT: 15.6 % (ref 12.3–15.4)
ERYTHROCYTE [DISTWIDTH] IN BLOOD BY AUTOMATED COUNT: 15.7 % (ref 12.3–15.4)
ERYTHROCYTE [DISTWIDTH] IN BLOOD BY AUTOMATED COUNT: 15.8 % (ref 12.3–15.4)
FERRITIN SERPL-MCNC: 233.6 NG/ML (ref 30–400)
FOLATE SERPL-MCNC: >20 NG/ML (ref 4.78–24.2)
GFR SERPL CREATININE-BSD FRML MDRD: 79 ML/MIN/1.73
GFR SERPL CREATININE-BSD FRML MDRD: 86 ML/MIN/1.73
GFR SERPL CREATININE-BSD FRML MDRD: 89 ML/MIN/1.73
GFR SERPL CREATININE-BSD FRML MDRD: 98 ML/MIN/1.73
GLOBULIN UR ELPH-MCNC: 3.9 GM/DL
GLOBULIN UR ELPH-MCNC: 4.1 GM/DL
GLOBULIN UR ELPH-MCNC: 4.7 GM/DL
GLOBULIN UR ELPH-MCNC: 4.9 GM/DL
GLUCOSE BLD-MCNC: 110 MG/DL (ref 65–99)
GLUCOSE BLD-MCNC: 75 MG/DL (ref 65–99)
GLUCOSE BLD-MCNC: 88 MG/DL (ref 65–99)
GLUCOSE BLD-MCNC: 89 MG/DL (ref 65–99)
GLUCOSE UR STRIP-MCNC: NEGATIVE MG/DL
GRAM STN SPEC: NORMAL
GRAM STN SPEC: NORMAL
HAV IGM SERPL QL IA: ABNORMAL
HBA1C MFR BLD: 4.66 % (ref 4.8–5.6)
HBV CORE IGM SERPL QL IA: ABNORMAL
HBV SURFACE AG SERPL QL IA: ABNORMAL
HCT VFR BLD AUTO: 25.6 % (ref 37.5–51)
HCT VFR BLD AUTO: 25.9 % (ref 37.5–51)
HCT VFR BLD AUTO: 27.9 % (ref 37.5–51)
HCT VFR BLD AUTO: 28.8 % (ref 37.5–51)
HCT VFR BLD AUTO: 32.1 % (ref 37.5–51)
HCV AB SER DONR QL: REACTIVE
HCV GENTYP SERPL NAA+PROBE: NORMAL
HCV RNA SERPL NAA+PROBE-ACNC: NORMAL IU/ML
HCV RNA SERPL NAA+PROBE-LOG IU: 6.81 LOG10 IU/ML
HDLC SERPL-MCNC: 23 MG/DL (ref 40–60)
HGB BLD-MCNC: 10 G/DL (ref 13–17.7)
HGB BLD-MCNC: 10.7 G/DL (ref 13–17.7)
HGB BLD-MCNC: 9.1 G/DL (ref 13–17.7)
HGB BLD-MCNC: 9.3 G/DL (ref 13–17.7)
HGB BLD-MCNC: 9.8 G/DL (ref 13–17.7)
HGB UR QL STRIP.AUTO: NEGATIVE
HOLD SPECIMEN: NORMAL
HOLD SPECIMEN: NORMAL
HYALINE CASTS UR QL AUTO: ABNORMAL /LPF
HYPOCHROMIA BLD QL: ABNORMAL
IMM GRANULOCYTES # BLD AUTO: 0 10*3/MM3 (ref 0–0.05)
IMM GRANULOCYTES # BLD AUTO: 0.01 10*3/MM3 (ref 0–0.05)
IMM GRANULOCYTES NFR BLD AUTO: 0 % (ref 0–0.5)
IMM GRANULOCYTES NFR BLD AUTO: 0.5 % (ref 0–0.5)
IMM GRANULOCYTES NFR BLD AUTO: 0.5 % (ref 0–0.5)
IMM GRANULOCYTES NFR BLD AUTO: 0.6 % (ref 0–0.5)
INR PPP: 1.37 (ref 0.8–1.2)
IRON 24H UR-MRATE: 46 MCG/DL (ref 59–158)
IRON SATN MFR SERPL: 26 % (ref 20–50)
KETONES UR QL STRIP: NEGATIVE
LDH FLD-CCNC: 85 U/L
LDLC SERPL CALC-MCNC: 40 MG/DL (ref 0–100)
LDLC/HDLC SERPL: 1.73 {RATIO}
LEUKOCYTE ESTERASE UR QL STRIP.AUTO: NEGATIVE
LIPASE SERPL-CCNC: 17 U/L (ref 13–60)
LIPASE SERPL-CCNC: 18 U/L (ref 13–60)
LYMPHOCYTES # BLD AUTO: 0.67 10*3/MM3 (ref 0.7–3.1)
LYMPHOCYTES # BLD AUTO: 0.68 10*3/MM3 (ref 0.7–3.1)
LYMPHOCYTES # BLD AUTO: 0.77 10*3/MM3 (ref 0.7–3.1)
LYMPHOCYTES # BLD AUTO: 1.2 10*3/MM3 (ref 0.7–3.1)
LYMPHOCYTES # BLD MANUAL: 0.66 10*3/MM3 (ref 0.7–3.1)
LYMPHOCYTES NFR BLD AUTO: 35.8 % (ref 19.6–45.3)
LYMPHOCYTES NFR BLD AUTO: 38.9 % (ref 19.6–45.3)
LYMPHOCYTES NFR BLD AUTO: 39.1 % (ref 19.6–45.3)
LYMPHOCYTES NFR BLD AUTO: 53.8 % (ref 19.6–45.3)
LYMPHOCYTES NFR BLD MANUAL: 12 % (ref 5–12)
LYMPHOCYTES NFR BLD MANUAL: 34 % (ref 19.6–45.3)
Lab: ABNORMAL
Lab: NORMAL
MCH RBC QN AUTO: 31.9 PG (ref 26.6–33)
MCH RBC QN AUTO: 32.2 PG (ref 26.6–33)
MCH RBC QN AUTO: 32.2 PG (ref 26.6–33)
MCH RBC QN AUTO: 32.5 PG (ref 26.6–33)
MCH RBC QN AUTO: 33.1 PG (ref 26.6–33)
MCHC RBC AUTO-ENTMCNC: 33.3 G/DL (ref 31.5–35.7)
MCHC RBC AUTO-ENTMCNC: 34.7 G/DL (ref 31.5–35.7)
MCHC RBC AUTO-ENTMCNC: 35.1 G/DL (ref 31.5–35.7)
MCHC RBC AUTO-ENTMCNC: 35.1 G/DL (ref 31.5–35.7)
MCHC RBC AUTO-ENTMCNC: 36.3 G/DL (ref 31.5–35.7)
MCV RBC AUTO: 91.1 FL (ref 79–97)
MCV RBC AUTO: 91.5 FL (ref 79–97)
MCV RBC AUTO: 91.8 FL (ref 79–97)
MCV RBC AUTO: 92 FL (ref 79–97)
MCV RBC AUTO: 97.6 FL (ref 79–97)
METHYLMALONATE SERPL-SCNC: 389 NMOL/L (ref 0–378)
MONOCYTES # BLD AUTO: 0.12 10*3/MM3 (ref 0.1–0.9)
MONOCYTES # BLD AUTO: 0.16 10*3/MM3 (ref 0.1–0.9)
MONOCYTES # BLD AUTO: 0.19 10*3/MM3 (ref 0.1–0.9)
MONOCYTES # BLD AUTO: 0.21 10*3/MM3 (ref 0.1–0.9)
MONOCYTES # BLD AUTO: 0.23 10*3/MM3 (ref 0.1–0.9)
MONOCYTES NFR BLD AUTO: 10.9 % (ref 5–12)
MONOCYTES NFR BLD AUTO: 11.2 % (ref 5–12)
MONOCYTES NFR BLD AUTO: 6.1 % (ref 5–12)
MONOCYTES NFR BLD AUTO: 7.2 % (ref 5–12)
MONOS+MACROS NFR FLD: 89 %
NEUTROPHILS # BLD AUTO: 0.78 10*3/MM3 (ref 1.7–7)
NEUTROPHILS # BLD AUTO: 0.8 10*3/MM3 (ref 1.7–7)
NEUTROPHILS # BLD AUTO: 0.94 10*3/MM3 (ref 1.7–7)
NEUTROPHILS # BLD AUTO: 1 10*3/MM3 (ref 1.7–7)
NEUTROPHILS # BLD AUTO: 1 10*3/MM3 (ref 1.7–7)
NEUTROPHILS NFR BLD AUTO: 35 % (ref 42.7–76)
NEUTROPHILS NFR BLD AUTO: 45.6 % (ref 42.7–76)
NEUTROPHILS NFR BLD AUTO: 50.4 % (ref 42.7–76)
NEUTROPHILS NFR BLD AUTO: 50.7 % (ref 42.7–76)
NEUTROPHILS NFR BLD MANUAL: 52 % (ref 42.7–76)
NEUTROPHILS NFR FLD MANUAL: 11 %
NITRITE UR QL STRIP: NEGATIVE
NRBC BLD AUTO-RTO: 0 /100 WBC (ref 0–0.2)
PH UR STRIP.AUTO: 5.5 [PH] (ref 5–9)
PLATELET # BLD AUTO: 103 10*3/MM3 (ref 140–450)
PLATELET # BLD AUTO: 84 10*3/MM3 (ref 140–450)
PLATELET # BLD AUTO: 86 10*3/MM3 (ref 140–450)
PLATELET # BLD AUTO: 93 10*3/MM3 (ref 140–450)
PLATELET # BLD AUTO: 96 10*3/MM3 (ref 140–450)
PMV BLD AUTO: 10.9 FL (ref 6–12)
PMV BLD AUTO: 11.3 FL (ref 6–12)
PMV BLD AUTO: 11.7 FL (ref 6–12)
PMV BLD AUTO: 12.1 FL (ref 6–12)
PMV BLD AUTO: 12.6 FL (ref 6–12)
POTASSIUM BLD-SCNC: 3.5 MMOL/L (ref 3.5–5.2)
POTASSIUM BLD-SCNC: 3.6 MMOL/L (ref 3.5–5.2)
POTASSIUM BLD-SCNC: 3.9 MMOL/L (ref 3.5–5.2)
POTASSIUM BLD-SCNC: 4.1 MMOL/L (ref 3.5–5.2)
PROT SERPL-MCNC: 6.3 G/DL (ref 6–8.5)
PROT SERPL-MCNC: 7 G/DL (ref 6–8.5)
PROT SERPL-MCNC: 7.3 G/DL (ref 6–8.5)
PROT SERPL-MCNC: 7.5 G/DL (ref 6–8.5)
PROT UR QL STRIP: ABNORMAL
PROTHROMBIN TIME: 16.7 SECONDS (ref 11.1–15.3)
RBC # BLD AUTO: 2.81 10*6/MM3 (ref 4.14–5.8)
RBC # BLD AUTO: 2.83 10*6/MM3 (ref 4.14–5.8)
RBC # BLD AUTO: 3.04 10*6/MM3 (ref 4.14–5.8)
RBC # BLD AUTO: 3.13 10*6/MM3 (ref 4.14–5.8)
RBC # BLD AUTO: 3.29 10*6/MM3 (ref 4.14–5.8)
RBC # FLD AUTO: 293 /MM3 (ref 0–0)
RBC # UR: ABNORMAL /HPF
REF LAB TEST METHOD: ABNORMAL
RETICS # AUTO: 0.03 10*6/MM3 (ref 0.02–0.13)
RETICS/RBC NFR AUTO: 0.89 % (ref 0.7–1.9)
SMALL PLATELETS BLD QL SMEAR: ABNORMAL
SODIUM BLD-SCNC: 137 MMOL/L (ref 136–145)
SODIUM BLD-SCNC: 138 MMOL/L (ref 136–145)
SODIUM BLD-SCNC: 139 MMOL/L (ref 136–145)
SODIUM BLD-SCNC: 141 MMOL/L (ref 136–145)
SP GR UR STRIP: 1.02 (ref 1–1.03)
SPECIMEN VOL FLD: 8400 ML
SQUAMOUS #/AREA URNS HPF: ABNORMAL /HPF
T3FREE SERPL-MCNC: 1.74 PG/ML (ref 2–4.4)
T4 FREE SERPL-MCNC: 1.36 NG/DL (ref 0.93–1.7)
TARGETS BLD QL SMEAR: ABNORMAL
TEST INFORMATION: NORMAL
TIBC SERPL-MCNC: 176 MCG/DL (ref 298–536)
TRANSFERRIN SERPL-MCNC: 118 MG/DL (ref 200–360)
TRIGL SERPL-MCNC: 61 MG/DL (ref 0–150)
TROPONIN T SERPL-MCNC: 0.01 NG/ML (ref 0–0.03)
TSH SERPL DL<=0.05 MIU/L-ACNC: 4.06 UIU/ML (ref 0.27–4.2)
TSH SERPL DL<=0.05 MIU/L-ACNC: 5.07 UIU/ML (ref 0.27–4.2)
UROBILINOGEN UR QL STRIP: ABNORMAL
VIT B12 BLD-MCNC: 412 PG/ML (ref 211–946)
VIT B12 BLD-MCNC: 540 PG/ML (ref 211–946)
VLDLC SERPL-MCNC: 12.2 MG/DL (ref 5–40)
WBC # FLD: 122.5 /MM3 (ref 0–5)
WBC MORPH BLD: NORMAL
WBC NRBC COR # BLD: 1.75 10*3/MM3 (ref 3.4–10.8)
WBC NRBC COR # BLD: 1.87 10*3/MM3 (ref 3.4–10.8)
WBC NRBC COR # BLD: 1.93 10*3/MM3 (ref 3.4–10.8)
WBC NRBC COR # BLD: 1.97 10*3/MM3 (ref 3.4–10.8)
WBC NRBC COR # BLD: 2.23 10*3/MM3 (ref 3.4–10.8)
WBC UR QL AUTO: ABNORMAL /HPF
WHOLE BLOOD HOLD SPECIMEN: NORMAL

## 2020-01-01 PROCEDURE — 76942 ECHO GUIDE FOR BIOPSY: CPT

## 2020-01-01 PROCEDURE — G2025 DIS SITE TELE SVCS RHC/FQHC: HCPCS | Performed by: FAMILY MEDICINE

## 2020-01-01 PROCEDURE — 25010000002 IOPAMIDOL 61 % SOLUTION: Performed by: EMERGENCY MEDICINE

## 2020-01-01 PROCEDURE — 87015 SPECIMEN INFECT AGNT CONCNTJ: CPT | Performed by: STUDENT IN AN ORGANIZED HEALTH CARE EDUCATION/TRAINING PROGRAM

## 2020-01-01 PROCEDURE — 87070 CULTURE OTHR SPECIMN AEROBIC: CPT | Performed by: STUDENT IN AN ORGANIZED HEALTH CARE EDUCATION/TRAINING PROGRAM

## 2020-01-01 PROCEDURE — 83690 ASSAY OF LIPASE: CPT | Performed by: EMERGENCY MEDICINE

## 2020-01-01 PROCEDURE — 82607 VITAMIN B-12: CPT | Performed by: STUDENT IN AN ORGANIZED HEALTH CARE EDUCATION/TRAINING PROGRAM

## 2020-01-01 PROCEDURE — 87902 NFCT AGT GNTYP ALYS HEP C: CPT

## 2020-01-01 PROCEDURE — G0180 MD CERTIFICATION HHA PATIENT: HCPCS | Performed by: FAMILY MEDICINE

## 2020-01-01 PROCEDURE — 80061 LIPID PANEL: CPT

## 2020-01-01 PROCEDURE — 82140 ASSAY OF AMMONIA: CPT | Performed by: EMERGENCY MEDICINE

## 2020-01-01 PROCEDURE — 83921 ORGANIC ACID SINGLE QUANT: CPT | Performed by: STUDENT IN AN ORGANIZED HEALTH CARE EDUCATION/TRAINING PROGRAM

## 2020-01-01 PROCEDURE — 97530 THERAPEUTIC ACTIVITIES: CPT

## 2020-01-01 PROCEDURE — 71045 X-RAY EXAM CHEST 1 VIEW: CPT

## 2020-01-01 PROCEDURE — 87205 SMEAR GRAM STAIN: CPT | Performed by: STUDENT IN AN ORGANIZED HEALTH CARE EDUCATION/TRAINING PROGRAM

## 2020-01-01 PROCEDURE — 82746 ASSAY OF FOLIC ACID SERUM: CPT | Performed by: STUDENT IN AN ORGANIZED HEALTH CARE EDUCATION/TRAINING PROGRAM

## 2020-01-01 PROCEDURE — 80053 COMPREHEN METABOLIC PANEL: CPT

## 2020-01-01 PROCEDURE — 84481 FREE ASSAY (FT-3): CPT

## 2020-01-01 PROCEDURE — 99214 OFFICE O/P EST MOD 30 MIN: CPT | Performed by: FAMILY MEDICINE

## 2020-01-01 PROCEDURE — 87522 HEPATITIS C REVRS TRNSCRPJ: CPT

## 2020-01-01 PROCEDURE — 93010 ELECTROCARDIOGRAM REPORT: CPT | Performed by: INTERNAL MEDICINE

## 2020-01-01 PROCEDURE — 85025 COMPLETE CBC W/AUTO DIFF WBC: CPT | Performed by: STUDENT IN AN ORGANIZED HEALTH CARE EDUCATION/TRAINING PROGRAM

## 2020-01-01 PROCEDURE — 85025 COMPLETE CBC W/AUTO DIFF WBC: CPT

## 2020-01-01 PROCEDURE — 97162 PT EVAL MOD COMPLEX 30 MIN: CPT

## 2020-01-01 PROCEDURE — 93005 ELECTROCARDIOGRAM TRACING: CPT | Performed by: STUDENT IN AN ORGANIZED HEALTH CARE EDUCATION/TRAINING PROGRAM

## 2020-01-01 PROCEDURE — 99204 OFFICE O/P NEW MOD 45 MIN: CPT | Performed by: INTERNAL MEDICINE

## 2020-01-01 PROCEDURE — 80053 COMPREHEN METABOLIC PANEL: CPT | Performed by: STUDENT IN AN ORGANIZED HEALTH CARE EDUCATION/TRAINING PROGRAM

## 2020-01-01 PROCEDURE — 80074 ACUTE HEPATITIS PANEL: CPT

## 2020-01-01 PROCEDURE — 93005 ELECTROCARDIOGRAM TRACING: CPT | Performed by: EMERGENCY MEDICINE

## 2020-01-01 PROCEDURE — 83615 LACTATE (LD) (LDH) ENZYME: CPT | Performed by: STUDENT IN AN ORGANIZED HEALTH CARE EDUCATION/TRAINING PROGRAM

## 2020-01-01 PROCEDURE — 83540 ASSAY OF IRON: CPT | Performed by: STUDENT IN AN ORGANIZED HEALTH CARE EDUCATION/TRAINING PROGRAM

## 2020-01-01 PROCEDURE — 99232 SBSQ HOSP IP/OBS MODERATE 35: CPT | Performed by: INTERNAL MEDICINE

## 2020-01-01 PROCEDURE — 97166 OT EVAL MOD COMPLEX 45 MIN: CPT

## 2020-01-01 PROCEDURE — 85730 THROMBOPLASTIN TIME PARTIAL: CPT | Performed by: EMERGENCY MEDICINE

## 2020-01-01 PROCEDURE — 25010000002 CEFTRIAXONE PER 250 MG: Performed by: STUDENT IN AN ORGANIZED HEALTH CARE EDUCATION/TRAINING PROGRAM

## 2020-01-01 PROCEDURE — 84466 ASSAY OF TRANSFERRIN: CPT | Performed by: STUDENT IN AN ORGANIZED HEALTH CARE EDUCATION/TRAINING PROGRAM

## 2020-01-01 PROCEDURE — G0378 HOSPITAL OBSERVATION PER HR: HCPCS

## 2020-01-01 PROCEDURE — 89051 BODY FLUID CELL COUNT: CPT | Performed by: STUDENT IN AN ORGANIZED HEALTH CARE EDUCATION/TRAINING PROGRAM

## 2020-01-01 PROCEDURE — 99223 1ST HOSP IP/OBS HIGH 75: CPT | Performed by: STUDENT IN AN ORGANIZED HEALTH CARE EDUCATION/TRAINING PROGRAM

## 2020-01-01 PROCEDURE — 99232 SBSQ HOSP IP/OBS MODERATE 35: CPT | Performed by: STUDENT IN AN ORGANIZED HEALTH CARE EDUCATION/TRAINING PROGRAM

## 2020-01-01 PROCEDURE — 84484 ASSAY OF TROPONIN QUANT: CPT | Performed by: EMERGENCY MEDICINE

## 2020-01-01 PROCEDURE — 25010000002 ALBUMIN HUMAN 25% PER 50 ML: Performed by: STUDENT IN AN ORGANIZED HEALTH CARE EDUCATION/TRAINING PROGRAM

## 2020-01-01 PROCEDURE — 82150 ASSAY OF AMYLASE: CPT | Performed by: STUDENT IN AN ORGANIZED HEALTH CARE EDUCATION/TRAINING PROGRAM

## 2020-01-01 PROCEDURE — 81001 URINALYSIS AUTO W/SCOPE: CPT | Performed by: EMERGENCY MEDICINE

## 2020-01-01 PROCEDURE — 85007 BL SMEAR W/DIFF WBC COUNT: CPT | Performed by: EMERGENCY MEDICINE

## 2020-01-01 PROCEDURE — 84443 ASSAY THYROID STIM HORMONE: CPT

## 2020-01-01 PROCEDURE — 0W9G3ZZ DRAINAGE OF PERITONEAL CAVITY, PERCUTANEOUS APPROACH: ICD-10-PCS | Performed by: RADIOLOGY

## 2020-01-01 PROCEDURE — 84443 ASSAY THYROID STIM HORMONE: CPT | Performed by: EMERGENCY MEDICINE

## 2020-01-01 PROCEDURE — 82042 OTHER SOURCE ALBUMIN QUAN EA: CPT | Performed by: STUDENT IN AN ORGANIZED HEALTH CARE EDUCATION/TRAINING PROGRAM

## 2020-01-01 PROCEDURE — 82306 VITAMIN D 25 HYDROXY: CPT

## 2020-01-01 PROCEDURE — 84439 ASSAY OF FREE THYROXINE: CPT

## 2020-01-01 PROCEDURE — 99284 EMERGENCY DEPT VISIT MOD MDM: CPT

## 2020-01-01 PROCEDURE — 82728 ASSAY OF FERRITIN: CPT | Performed by: STUDENT IN AN ORGANIZED HEALTH CARE EDUCATION/TRAINING PROGRAM

## 2020-01-01 PROCEDURE — 80053 COMPREHEN METABOLIC PANEL: CPT | Performed by: EMERGENCY MEDICINE

## 2020-01-01 PROCEDURE — 83036 HEMOGLOBIN GLYCOSYLATED A1C: CPT

## 2020-01-01 PROCEDURE — 87075 CULTR BACTERIA EXCEPT BLOOD: CPT | Performed by: STUDENT IN AN ORGANIZED HEALTH CARE EDUCATION/TRAINING PROGRAM

## 2020-01-01 PROCEDURE — 85045 AUTOMATED RETICULOCYTE COUNT: CPT | Performed by: STUDENT IN AN ORGANIZED HEALTH CARE EDUCATION/TRAINING PROGRAM

## 2020-01-01 PROCEDURE — 85610 PROTHROMBIN TIME: CPT | Performed by: EMERGENCY MEDICINE

## 2020-01-01 PROCEDURE — 83690 ASSAY OF LIPASE: CPT

## 2020-01-01 PROCEDURE — 85025 COMPLETE CBC W/AUTO DIFF WBC: CPT | Performed by: EMERGENCY MEDICINE

## 2020-01-01 PROCEDURE — 99239 HOSP IP/OBS DSCHRG MGMT >30: CPT | Performed by: STUDENT IN AN ORGANIZED HEALTH CARE EDUCATION/TRAINING PROGRAM

## 2020-01-01 PROCEDURE — 82607 VITAMIN B-12: CPT

## 2020-01-01 PROCEDURE — 74177 CT ABD & PELVIS W/CONTRAST: CPT

## 2020-01-01 PROCEDURE — P9047 ALBUMIN (HUMAN), 25%, 50ML: HCPCS | Performed by: STUDENT IN AN ORGANIZED HEALTH CARE EDUCATION/TRAINING PROGRAM

## 2020-01-01 PROCEDURE — 83605 ASSAY OF LACTIC ACID: CPT

## 2020-01-01 PROCEDURE — 82150 ASSAY OF AMYLASE: CPT

## 2020-01-01 PROCEDURE — 70450 CT HEAD/BRAIN W/O DYE: CPT

## 2020-01-01 RX ORDER — SODIUM CHLORIDE 0.9 % (FLUSH) 0.9 %
10 SYRINGE (ML) INJECTION EVERY 12 HOURS SCHEDULED
Status: DISCONTINUED | OUTPATIENT
Start: 2020-01-01 | End: 2020-01-01 | Stop reason: HOSPADM

## 2020-01-01 RX ORDER — DEXTROSE AND SODIUM CHLORIDE 5; .45 G/100ML; G/100ML
30 INJECTION, SOLUTION INTRAVENOUS CONTINUOUS PRN
Status: CANCELLED | OUTPATIENT
Start: 2020-01-01

## 2020-01-01 RX ORDER — ONDANSETRON 4 MG/1
4 TABLET, FILM COATED ORAL EVERY 6 HOURS PRN
Status: DISCONTINUED | OUTPATIENT
Start: 2020-01-01 | End: 2020-01-01 | Stop reason: HOSPADM

## 2020-01-01 RX ORDER — MELATONIN
2000 DAILY
Status: DISCONTINUED | OUTPATIENT
Start: 2020-01-01 | End: 2020-01-01 | Stop reason: HOSPADM

## 2020-01-01 RX ORDER — ERGOCALCIFEROL 1.25 MG/1
50000 CAPSULE ORAL
Qty: 4 CAPSULE | Refills: 3 | Status: SHIPPED | OUTPATIENT
Start: 2020-01-01 | End: 2020-01-01 | Stop reason: SDUPTHER

## 2020-01-01 RX ORDER — MORPHINE SULFATE 20 MG/ML
5 SOLUTION ORAL
Qty: 30 ML | Refills: 0 | Status: SHIPPED | OUTPATIENT
Start: 2020-01-01

## 2020-01-01 RX ORDER — CLOPIDOGREL BISULFATE 75 MG/1
75 TABLET ORAL DAILY
Status: DISCONTINUED | OUTPATIENT
Start: 2020-01-01 | End: 2020-01-01

## 2020-01-01 RX ORDER — ALBUMIN (HUMAN) 12.5 G/50ML
50 SOLUTION INTRAVENOUS ONCE
Status: COMPLETED | OUTPATIENT
Start: 2020-01-01 | End: 2020-01-01

## 2020-01-01 RX ORDER — FERROUS SULFATE TAB EC 324 MG (65 MG FE EQUIVALENT) 324 (65 FE) MG
324 TABLET DELAYED RESPONSE ORAL
Status: DISCONTINUED | OUTPATIENT
Start: 2020-01-01 | End: 2020-01-01 | Stop reason: HOSPADM

## 2020-01-01 RX ORDER — SPIRONOLACTONE 25 MG/1
25 TABLET ORAL DAILY
Qty: 30 TABLET | Refills: 0 | Status: SHIPPED | OUTPATIENT
Start: 2020-01-01 | End: 2020-01-01

## 2020-01-01 RX ORDER — LEVOTHYROXINE SODIUM 0.05 MG/1
50 TABLET ORAL DAILY
Qty: 30 TABLET | Refills: 3 | Status: SHIPPED | OUTPATIENT
Start: 2020-01-01

## 2020-01-01 RX ORDER — RAMIPRIL 5 MG/1
5 CAPSULE ORAL DAILY
Status: DISCONTINUED | OUTPATIENT
Start: 2020-01-01 | End: 2020-01-01 | Stop reason: HOSPADM

## 2020-01-01 RX ORDER — CHOLECALCIFEROL (VITAMIN D3) 125 MCG
500 CAPSULE ORAL DAILY
Qty: 30 TABLET | Refills: 0 | Status: SHIPPED | OUTPATIENT
Start: 2020-01-01 | End: 2020-01-01 | Stop reason: SDUPTHER

## 2020-01-01 RX ORDER — SPIRONOLACTONE 25 MG/1
25 TABLET ORAL DAILY
Status: DISCONTINUED | OUTPATIENT
Start: 2020-01-01 | End: 2020-01-01

## 2020-01-01 RX ORDER — SODIUM CHLORIDE 0.9 % (FLUSH) 0.9 %
10 SYRINGE (ML) INJECTION AS NEEDED
Status: DISCONTINUED | OUTPATIENT
Start: 2020-01-01 | End: 2020-01-01 | Stop reason: HOSPADM

## 2020-01-01 RX ORDER — LEVOTHYROXINE SODIUM 0.05 MG/1
50 TABLET ORAL
Status: DISCONTINUED | OUTPATIENT
Start: 2020-01-01 | End: 2020-01-01 | Stop reason: HOSPADM

## 2020-01-01 RX ORDER — ONDANSETRON 2 MG/ML
4 INJECTION INTRAMUSCULAR; INTRAVENOUS EVERY 6 HOURS PRN
Status: DISCONTINUED | OUTPATIENT
Start: 2020-01-01 | End: 2020-01-01 | Stop reason: HOSPADM

## 2020-01-01 RX ORDER — FAMOTIDINE 40 MG/1
40 TABLET, FILM COATED ORAL DAILY
Status: DISCONTINUED | OUTPATIENT
Start: 2020-01-01 | End: 2020-01-01

## 2020-01-01 RX ORDER — MORPHINE SULFATE 2 MG/ML
2 INJECTION, SOLUTION INTRAMUSCULAR; INTRAVENOUS EVERY 4 HOURS PRN
Status: DISCONTINUED | OUTPATIENT
Start: 2020-01-01 | End: 2020-01-01 | Stop reason: HOSPADM

## 2020-01-01 RX ORDER — ASPIRIN 81 MG/1
81 TABLET, CHEWABLE ORAL DAILY
Status: DISCONTINUED | OUTPATIENT
Start: 2020-01-01 | End: 2020-01-01 | Stop reason: HOSPADM

## 2020-01-01 RX ORDER — ERGOCALCIFEROL 1.25 MG/1
50000 CAPSULE ORAL
Qty: 4 CAPSULE | Refills: 3 | Status: SHIPPED | OUTPATIENT
Start: 2020-01-01 | End: 2020-01-01

## 2020-01-01 RX ORDER — LEVOTHYROXINE SODIUM 0.07 MG/1
75 TABLET ORAL
Status: DISCONTINUED | OUTPATIENT
Start: 2020-01-01 | End: 2020-01-01

## 2020-01-01 RX ORDER — FUROSEMIDE 40 MG/1
TABLET ORAL
Qty: 30 TABLET | Refills: 0 | Status: SHIPPED | OUTPATIENT
Start: 2020-01-01

## 2020-01-01 RX ORDER — FUROSEMIDE 40 MG/1
40 TABLET ORAL DAILY
Status: DISCONTINUED | OUTPATIENT
Start: 2020-01-01 | End: 2020-01-01 | Stop reason: HOSPADM

## 2020-01-01 RX ORDER — CHOLECALCIFEROL (VITAMIN D3) 125 MCG
500 CAPSULE ORAL DAILY
Qty: 30 TABLET | Refills: 0 | Status: SHIPPED | OUTPATIENT
Start: 2020-01-01 | End: 2020-01-01

## 2020-01-01 RX ORDER — KETOROLAC TROMETHAMINE 15 MG/ML
15 INJECTION, SOLUTION INTRAMUSCULAR; INTRAVENOUS EVERY 6 HOURS PRN
Status: DISCONTINUED | OUTPATIENT
Start: 2020-01-01 | End: 2020-01-01

## 2020-01-01 RX ORDER — MEMANTINE HYDROCHLORIDE 5 MG/1
TABLET ORAL
Qty: 180 TABLET | Refills: 1 | Status: SHIPPED | OUTPATIENT
Start: 2020-01-01

## 2020-01-01 RX ORDER — TAMSULOSIN HYDROCHLORIDE 0.4 MG/1
0.8 CAPSULE ORAL NIGHTLY
Status: DISCONTINUED | OUTPATIENT
Start: 2020-01-01 | End: 2020-01-01 | Stop reason: HOSPADM

## 2020-01-01 RX ORDER — AMLODIPINE BESYLATE 5 MG/1
TABLET ORAL
Qty: 90 TABLET | Refills: 1 | Status: SHIPPED | OUTPATIENT
Start: 2020-01-01 | End: 2020-01-01

## 2020-01-01 RX ORDER — ONDANSETRON 4 MG/1
4 TABLET, FILM COATED ORAL EVERY 6 HOURS PRN
Qty: 10 TABLET | Refills: 0 | Status: SHIPPED | OUTPATIENT
Start: 2020-01-01

## 2020-01-01 RX ORDER — AMLODIPINE BESYLATE 5 MG/1
5 TABLET ORAL DAILY
Status: DISCONTINUED | OUTPATIENT
Start: 2020-01-01 | End: 2020-01-01 | Stop reason: HOSPADM

## 2020-01-01 RX ORDER — FAMOTIDINE 10 MG
40 TABLET ORAL DAILY
Status: DISCONTINUED | OUTPATIENT
Start: 2020-01-01 | End: 2020-01-01 | Stop reason: HOSPADM

## 2020-01-01 RX ORDER — FUROSEMIDE 40 MG/1
40 TABLET ORAL DAILY
Qty: 30 TABLET | Refills: 0 | Status: SHIPPED | OUTPATIENT
Start: 2020-01-01 | End: 2020-01-01

## 2020-01-01 RX ORDER — SPIRONOLACTONE 25 MG/1
TABLET ORAL
Qty: 30 TABLET | Refills: 0 | Status: SHIPPED | OUTPATIENT
Start: 2020-01-01 | End: 2020-01-01

## 2020-01-01 RX ORDER — TAMSULOSIN HYDROCHLORIDE 0.4 MG/1
2 CAPSULE ORAL NIGHTLY
Qty: 180 CAPSULE | Refills: 1 | Status: SHIPPED | OUTPATIENT
Start: 2020-01-01

## 2020-01-01 RX ORDER — MEMANTINE HYDROCHLORIDE 5 MG/1
5 TABLET ORAL 2 TIMES DAILY
Status: DISCONTINUED | OUTPATIENT
Start: 2020-01-01 | End: 2020-01-01 | Stop reason: HOSPADM

## 2020-01-01 RX ADMIN — MELATONIN 2000 UNITS: at 08:06

## 2020-01-01 RX ADMIN — FERROUS SULFATE TAB EC 324 MG (65 MG FE EQUIVALENT) 324 MG: 324 (65 FE) TABLET DELAYED RESPONSE at 12:47

## 2020-01-01 RX ADMIN — FAMOTIDINE 40 MG: 10 TABLET ORAL at 10:57

## 2020-01-01 RX ADMIN — MEMANTINE 5 MG: 5 TABLET ORAL at 20:27

## 2020-01-01 RX ADMIN — AMLODIPINE BESYLATE 5 MG: 5 TABLET ORAL at 08:45

## 2020-01-01 RX ADMIN — FUROSEMIDE 40 MG: 40 TABLET ORAL at 08:44

## 2020-01-01 RX ADMIN — FUROSEMIDE 40 MG: 40 TABLET ORAL at 10:58

## 2020-01-01 RX ADMIN — RAMIPRIL 5 MG: 5 CAPSULE ORAL at 10:58

## 2020-01-01 RX ADMIN — FERROUS SULFATE TAB EC 324 MG (65 MG FE EQUIVALENT) 324 MG: 324 (65 FE) TABLET DELAYED RESPONSE at 08:07

## 2020-01-01 RX ADMIN — TAMSULOSIN HYDROCHLORIDE 0.8 MG: 0.4 CAPSULE ORAL at 20:27

## 2020-01-01 RX ADMIN — FAMOTIDINE 40 MG: 10 TABLET ORAL at 08:45

## 2020-01-01 RX ADMIN — CEFTRIAXONE SODIUM 2 G: 2 INJECTION, POWDER, FOR SOLUTION INTRAMUSCULAR; INTRAVENOUS at 17:00

## 2020-01-01 RX ADMIN — FUROSEMIDE 40 MG: 40 TABLET ORAL at 08:07

## 2020-01-01 RX ADMIN — TAMSULOSIN HYDROCHLORIDE 0.8 MG: 0.4 CAPSULE ORAL at 20:45

## 2020-01-01 RX ADMIN — AMLODIPINE BESYLATE 5 MG: 5 TABLET ORAL at 10:58

## 2020-01-01 RX ADMIN — MEMANTINE 5 MG: 5 TABLET ORAL at 20:45

## 2020-01-01 RX ADMIN — SODIUM CHLORIDE, PRESERVATIVE FREE 10 ML: 5 INJECTION INTRAVENOUS at 08:44

## 2020-01-01 RX ADMIN — MEMANTINE 5 MG: 5 TABLET ORAL at 10:57

## 2020-01-01 RX ADMIN — IOPAMIDOL 90 ML: 612 INJECTION, SOLUTION INTRAVENOUS at 21:49

## 2020-01-01 RX ADMIN — ASPIRIN 81 MG 81 MG: 81 TABLET ORAL at 08:06

## 2020-01-01 RX ADMIN — CEFTRIAXONE SODIUM 2 G: 2 INJECTION, POWDER, FOR SOLUTION INTRAMUSCULAR; INTRAVENOUS at 17:50

## 2020-01-01 RX ADMIN — SODIUM CHLORIDE, PRESERVATIVE FREE 10 ML: 5 INJECTION INTRAVENOUS at 20:45

## 2020-01-01 RX ADMIN — LEVOTHYROXINE SODIUM 50 MCG: 50 TABLET ORAL at 06:26

## 2020-01-01 RX ADMIN — RAMIPRIL 5 MG: 5 CAPSULE ORAL at 08:07

## 2020-01-01 RX ADMIN — MEMANTINE 5 MG: 5 TABLET ORAL at 08:07

## 2020-01-01 RX ADMIN — SODIUM CHLORIDE, PRESERVATIVE FREE 10 ML: 5 INJECTION INTRAVENOUS at 03:35

## 2020-01-01 RX ADMIN — AMLODIPINE BESYLATE 5 MG: 5 TABLET ORAL at 08:07

## 2020-01-01 RX ADMIN — FAMOTIDINE 40 MG: 10 TABLET ORAL at 08:09

## 2020-01-01 RX ADMIN — RAMIPRIL 5 MG: 5 CAPSULE ORAL at 08:44

## 2020-01-01 RX ADMIN — ASPIRIN 81 MG 81 MG: 81 TABLET ORAL at 10:58

## 2020-01-01 RX ADMIN — SODIUM CHLORIDE, PRESERVATIVE FREE 10 ML: 5 INJECTION INTRAVENOUS at 10:59

## 2020-01-01 RX ADMIN — MEMANTINE 5 MG: 5 TABLET ORAL at 08:44

## 2020-01-01 RX ADMIN — LEVOTHYROXINE SODIUM 75 MCG: 75 TABLET ORAL at 05:52

## 2020-01-01 RX ADMIN — LEVOTHYROXINE SODIUM 50 MCG: 50 TABLET ORAL at 06:03

## 2020-01-01 RX ADMIN — MELATONIN 2000 UNITS: at 10:58

## 2020-01-01 RX ADMIN — ALBUMIN HUMAN 50 G: 0.25 SOLUTION INTRAVENOUS at 15:19

## 2020-01-01 RX ADMIN — SODIUM CHLORIDE, PRESERVATIVE FREE 10 ML: 5 INJECTION INTRAVENOUS at 20:27

## 2020-01-01 RX ADMIN — ASPIRIN 81 MG 81 MG: 81 TABLET ORAL at 08:45

## 2020-01-01 RX ADMIN — MELATONIN 2000 UNITS: at 08:44

## 2020-01-02 NOTE — TELEPHONE ENCOUNTER
Catrina called and stated every since he started the thyroid medication he has been having a bowel movement 3 times a day.  She cut pill in half and just gave him half and he only went once that dayIt is not runny but it is soft.  Wanting to know if this is a side effect of the levothyroxine.

## 2020-01-02 NOTE — TELEPHONE ENCOUNTER
Patient's wife aware.  Stated he took a whole one today and had several bowel movements.  Wife gave verbal understanding of taking half a day and it still having issues go to 1/2 tab every other day.

## 2020-01-14 NOTE — TELEPHONE ENCOUNTER
Wife, Catrina requested a call to discuss what process can be taken to start Jonnathan with Hospice.    Catrina requested a call at [phone].

## 2020-01-20 NOTE — TELEPHONE ENCOUNTER
Ms. Albert called and stated that home health only comes 2 days a week. She has decided that she doent want them anymore that she wants hospice. She states the she needs more help to get him in the care, bath him, etc. Please call [phone]

## 2020-01-20 NOTE — TELEPHONE ENCOUNTER
Patients wife called back stating she is not wanting to go with Hospice, instead she would rather her  receive Home Health. She says she cancelled the interview where a home health Rep was supposed to be going to their home since she thought Hospice would be better for the patient, but she is now needing to have that interview rescheduled. Please call patients wife back at the earliest convenience to discuss rescheduling home health for the patient. Call back number is [phone]

## 2020-01-21 NOTE — TELEPHONE ENCOUNTER
Patient aware hospice will not come in unless he is been deemed under 6 months to live for comfort measures. Spoke with her and advised she just needs to call them back and let them know she has decided to go with home health.

## 2020-01-22 NOTE — TELEPHONE ENCOUNTER
Edwin with [facility] called stating that they think patient would benefit from a hospital bed and bedside urinal.     She stated that he is not very cooperative but they will see what they can do with therapy. He just does not want to be bothered. He did remember his birthday.     Please call at [phone]

## 2020-01-24 NOTE — TELEPHONE ENCOUNTER
Mr Albert is starting to get bed sores because his dementia is worsening and he is difficult to get up out of bed.  Ordering a hospital bed and a bedside commode.

## 2020-01-27 NOTE — TELEPHONE ENCOUNTER
Jo from Home Health requested a call to go over health concerns regarding Jonnathan     Would not disclosed details.    Please call [phone]

## 2020-01-27 NOTE — TELEPHONE ENCOUNTER
Patti with [facility] is requesting notes from most recent office visit. FAX NUMBER: [phone]

## 2020-01-30 NOTE — TELEPHONE ENCOUNTER
Spoke with Jo and patient is refusing any kind of PT/OT.  The  is going out to evaluate him and talk to Mrs Albert about putting him in a nursing home because he is so combative with her as well as with the home health staff.      His stomach is swollen and she is concerned about that.  She did advise that she take him to the ER or call the ambulance to take him and have him evaluated with the stomach.  They wanted you to know so if she doesn't take him to the ER, because she had stated she would get him an appt with you.

## 2020-01-30 NOTE — TELEPHONE ENCOUNTER
Spoke with Home Health nurse and . Covering for SCOTTY Maya who is pt's PCP.  and nurse reported pt having abdominal distention, abdominal pain, lethargy.  I advised nurse to call 911 or send pt to ER. Pt has history of Dementia and wife is pt's legal guardian. Spoke to nurse again on phone and reported that pt did go to ER and is currently being evaluated.           This document has been electronically signed by Maxi Morales MD on January 30, 2020 1:50 PM

## 2020-01-31 NOTE — TELEPHONE ENCOUNTER
Spoke with Berry at [facility] wanted to know about Hep C dx not showing in chart where patient has Hep C

## 2020-01-31 NOTE — TELEPHONE ENCOUNTER
[facility]  Has some questions about MR. Albert and the Hep C he was diagnosed with in 2005. Please call [phone] and ask for Berry

## 2020-03-06 PROBLEM — Z91.81 AT HIGH RISK FOR FALLS: Status: ACTIVE | Noted: 2020-01-01

## 2020-03-06 PROBLEM — G31.9 CEREBRAL ATROPHY (HCC): Status: ACTIVE | Noted: 2020-01-01

## 2020-03-06 PROBLEM — R73.01 IMPAIRED FASTING GLUCOSE: Status: ACTIVE | Noted: 2020-01-01

## 2020-03-06 PROBLEM — R26.81 GAIT INSTABILITY: Status: ACTIVE | Noted: 2020-01-01

## 2020-03-06 PROBLEM — F01.518 VASCULAR DEMENTIA WITH BEHAVIOR DISTURBANCE (HCC): Status: ACTIVE | Noted: 2020-01-01

## 2020-03-06 PROBLEM — E03.9 HYPOTHYROIDISM: Status: ACTIVE | Noted: 2020-01-01

## 2020-03-06 NOTE — TELEPHONE ENCOUNTER
Called pt. No answer/No voicemail. Pt is scheduled at [facility] on 03/11/2020 to have US of Abdomen done. Pt needs to be at [facility] by the ER by 9 am. Pt is to have nothing to eat or drink after midnight.

## 2020-03-11 NOTE — TELEPHONE ENCOUNTER
Called pt and pt's wife to discuss US of abdomen done on 3/11/20.  Per report pt has ascites and cirrhosis. Will refer to Gastroenterology ASAP at [facility].  Advised pt to go to ER if symptoms worrisome or severe.  Pt's wife voiced understanding and all questions answered    Recheck on next visit.          This document has been electronically signed by Maxi Morales MD on March 11, 2020 15:04

## 2020-03-16 NOTE — TELEPHONE ENCOUNTER
Gave pt's wife results and recommendations. She states pt eats only one meal a day which consists of and egg, 2 hot dogs, and toast with butter and jelly. Pt's wife states she is wanting to get home health to come in and help with ADL's for pt. She states pt is having diarrhea. Pt is scheduled to see Gastro 03/17/2020 and I made her aware of this appointment.

## 2020-03-16 NOTE — TELEPHONE ENCOUNTER
----- Message from Maxi Morales MD sent at 3/11/2020  7:17 AM CDT -----  Please call pt's wife    hga1c at 4.66 pt is not diabetic or prediabetic    Vitamin D is low and recommend pt start taking Vitamin D3 18010 units once a week give 4 pills and 3 refills     Vitamin B12 levels normal    Pt does have hepatitis C antibodies on labwork awaiting hep C RNA to see if pt has active disease    On thyroid studies. Thyroid is still slightly underactive. Improved from last check. Recommend pt go up on synthroid from 25 to 50 mcg daily. Give 30 pills and 3 refills    On CMP calcium levels slightly low and recommend he increase his calcium intake with supplement OTC or calcium rich foods    On lipid panel HDL is low and recommend heart healthy diet    On CBC pt is anemic along with platelets low.  He will eventually need to see Hematologist. Awaiting US of liver. Please update pt on status and when he is to get this done    Recheck on next visit. Thanks

## 2020-03-16 NOTE — TELEPHONE ENCOUNTER
----- Message from Maxi Morales MD sent at 3/12/2020  7:15 AM CDT -----  Please call pt    He does have active hepatitis C.  [facility]so[facility] along with ascites    Recheck on next visit. Thanks

## 2020-03-16 NOTE — TELEPHONE ENCOUNTER
I see referral order for pt back in January    Please verify if pt has home health    Proceed with Gastroenterology appt recheck on next visit. Thanks

## 2020-03-16 NOTE — TELEPHONE ENCOUNTER
----- Message from Maxi Morales MD sent at 3/13/2020  5:39 PM CDT -----  Pt will need to followup with his Gastroenterologist regarding his recent labwork and active hepatitis C . Thanks

## 2020-03-17 PROBLEM — R18.8 POORLY CONTROLLED ASCITES: Status: ACTIVE | Noted: 2020-01-01

## 2020-03-17 PROBLEM — K74.60 HEPATIC CIRRHOSIS (HCC): Status: ACTIVE | Noted: 2020-01-01

## 2020-03-17 NOTE — TELEPHONE ENCOUNTER
Wife stated yesterday that home health came one time to do PT and wanted to check pt's temperature and pt did not want them to so they left and never came back. Wife stated it was not for assistance with ADL's it was for PT. She would like assistance with pt's bathing and dressing.

## 2020-03-17 NOTE — PROGRESS NOTES
[facility]      Chief Complaint:   Chief Complaint   Patient presents with   • Liver Cirrhosis   • Bloated   • Abdominal Pain   • Urinary Incontinence   • Fecal Incontinence   • Hepatitis C       Subjective     HPI:   Mr. Albert is a 75-year-old -American male with past medical history of dementia, depression, hypertension, heartburn, CVA, anxiety, prostatitis presenting for evaluation for increased abdominal girth of 4 months duration.  Patient has history of cirrhosis diagnosed at [facility] in 2005.  Cirrhosis was thought to be secondary to hepatitis C infection.  Patient denied history of treatment.  He has dementia currently and has bouts of fecal and urinary incontinence.  Denied abdominal pain, nausea, vomiting, diarrhea, constipation, rectal bleeding or weight loss.  Has pedal edema and increased abdominal girth for past 4 months.  Denied history of alcohol usage.    Past Medical History:   Past Medical History:   Diagnosis Date   • Anxiety state    • Chronic hepatitis C (CMS/HCC)    • Dementia (CMS/HCC)    • Depressive disorder    • Encounter for screening for malignant neoplasm of prostate    • Epigastric pain    • Essential hypertension    • Heartburn    • Impacted cerumen    • Prostatitis    • Special screening for malignant neoplasm of colon    • Urinary tract infectious disease    • Vascular insufficiency        Past Surgical History:  Past Surgical History:   Procedure Laterality Date   • VASCULAR SURGERY         Family History:  Family History   Problem Relation Age of Onset   • Dementia Other    • Diabetes Other    • Heart disease Other    • Hypertension Other    • Alcohol abuse Other    • Lung cancer Other    • Rheum arthritis Other    • Stroke Other        Social History:   reports that he has quit smoking. He has never used smokeless tobacco. He reports that he drank alcohol. He reports that he has current or past drug history. Drug:  Heroin.    Medications:   Prior to Admission medications    Medication Sig Start Date End Date Taking? Authorizing Provider   amLODIPine (NORVASC) 5 MG tablet Take 1 tablet by mouth Daily. 5/20/19  Yes Lucille Vinson APRN   aspirin 81 MG chewable tablet Chew 1 tablet Daily. 1/24/19  Yes Lucille Vinson APRN   clopidogrel (PLAVIX) 75 MG tablet Take 1 tablet by mouth Daily. 5/20/19  Yes Lucille Vinson APRN   famotidine (PEPCID) 40 MG tablet Take 1 tablet by mouth Daily. 11/21/19  Yes Lucille Vinson APRN   levothyroxine (SYNTHROID) 50 MCG tablet Take 1 tablet by mouth Daily. 3/16/20  Yes Maxi Morales MD   memantine (NAMENDA) 5 MG tablet Take 1 tablet by mouth 2 (Two) Times a Day. 1/24/19  Yes Lucille Vinson APRN   miconazole (LOTRIMIN AF) 2 % powder Apply  topically to the appropriate area as directed As Needed for Itching. 1/24/19  Yes Lucille Vinson APRN   MILK THISTLE PO Take  by mouth.   Yes Danielle Mares MD   Misc. Devices (COMMODE BEDSIDE) [facility] Bedside commode to be used as needed due to difficulty with ambulation and mobility. 1/24/20  Yes Lucille Vinson APRN   ramipril (ALTACE) 5 MG capsule TAKE 1 CAPSULE DAILY 7/2/19  Yes Lucille Vinson APRN   tamsulosin (FLOMAX) 0.4 MG capsule 24 hr capsule Take 2 capsules by mouth Every Night. 3/6/20  Yes Maxi Morales MD   TRACE MIN [id number] PO Take 1 tablet by mouth Daily.   Yes Danielle Mares MD   vitamin D (ERGOCALCIFEROL) 1.25 MG (04990 UT) capsule capsule Take 1 capsule by mouth Every 7 (Seven) Days. 3/16/20  Yes Maxi Morales MD       Allergies:  Patient has no known allergies.    ROS:    Review of Systems   Constitutional: Negative for chills, fatigue, fever and unexpected weight change.   HENT: Negative for congestion, ear discharge, hearing loss, nosebleeds and sore throat.    Eyes: Negative for pain, discharge and redness.   Respiratory: Negative for cough, chest tightness, shortness of breath and  "wheezing.    Cardiovascular: Positive for leg swelling. Negative for chest pain and palpitations.   Gastrointestinal: Positive for abdominal distention. Negative for abdominal pain, blood in stool, constipation, diarrhea, nausea and vomiting.   Endocrine: Negative for cold intolerance, polydipsia, polyphagia and polyuria.   Genitourinary: Negative for dysuria, flank pain, frequency, hematuria and urgency.   Musculoskeletal: Negative for arthralgias, back pain, joint swelling and myalgias.   Skin: Negative for color change, pallor and rash.   Neurological: Negative for tremors, seizures, syncope, weakness and headaches.   Hematological: Negative for adenopathy. Does not bruise/bleed easily.   Psychiatric/Behavioral: Negative for behavioral problems, confusion, dysphoric mood, hallucinations and suicidal ideas. The patient is not nervous/anxious.      Objective     Blood pressure 111/83, pulse 86, height 172.7 cm (68\").    Physical Exam   Constitutional: He is oriented to person, place, and time. He appears well-developed and well-nourished.   HENT:   Head: Normocephalic and atraumatic.   Mouth/Throat: Oropharynx is clear and moist.   Eyes: Pupils are equal, round, and reactive to light. Conjunctivae and EOM are normal.   Neck: Normal range of motion. Neck supple. No thyromegaly present.   Cardiovascular: Normal rate, regular rhythm and normal heart sounds.   No murmur heard.  Pulmonary/Chest: Effort normal and breath sounds normal. He has no wheezes.   Abdominal: Soft. Bowel sounds are normal. He exhibits distension. He exhibits no mass. There is no tenderness. No hernia.   Genitourinary:   Genitourinary Comments: No lesions noted   Musculoskeletal: Normal range of motion. He exhibits edema. He exhibits no tenderness.   Lymphadenopathy:     He has no cervical adenopathy.   Neurological: He is alert and oriented to person, place, and time. No cranial nerve deficit.   Skin: Skin is warm and dry. No rash noted. "   Psychiatric: He has a normal mood and affect. Thought content normal.      Extremities: No edema, cyanosis or clubbing.    Assessment/Plan   Jonnathan was seen today for liver cirrhosis, bloated, abdominal pain, urinary incontinence, fecal incontinence and hepatitis c.    Diagnoses and all orders for this visit:    Hepatic cirrhosis, unspecified hepatic cirrhosis type, unspecified whether ascites present (CMS/HCC)  -     Case Request; Standing  -     Case Request  -     AFP Tumor Marker  -     Comprehensive Metabolic Panel  -     Protime-INR    Other orders  -     Follow Anesthesia Guidelines / Standing Orders; Future  -     Obtain Informed Consent; Future    1.  Ascites, add no added salt diet, Aldactone and Lasix.  Patient's wife is reluctant for him to take diuretics given the current urinary incontinence with the possibility of worsening.  Counseled both patient and his wife.  Will obtain AFP, CBC, CMP.  Awaiting abdominal ultrasound scheduled for this week.  2.  Variceal screening, schedule EGD.  3.  History of chronic hepatitis C, treatment per stabilization of liver disease.  4.  Fecal incontinence, likely due to dementia.  Need to rule out GI etiology if continues.  Add high-fiber diet.  5.  Dementia, recommend placement.  Patient's wife is reluctant.  6.  History of CVA    ESOPHAGOGASTRODUODENOSCOPY (N/A)     Diagnosis Plan   1. Hepatic cirrhosis, unspecified hepatic cirrhosis type, unspecified whether ascites present (CMS/HCC)  Case Request    dextrose 5 % and sodium chloride 0.45 % infusion    Case Request    AFP Tumor Marker    Comprehensive Metabolic Panel    Protime-INR       Anticipated Surgical Procedure:  Orders Placed This Encounter   Procedures   • AFP Tumor Marker   • Comprehensive Metabolic Panel   • Protime-INR   • Follow Anesthesia Guidelines / Standing Orders     Standing Status:   Future   • Obtain Informed Consent     Standing Status:   Future     Order Specific Question:   Informed Consent  Given For     Answer:   ESOPHAGOGASTRODUODENOSCOPY       The risks, benefits, and alternatives of this procedure have been discussed with the patient or the responsible party- the patient understands and agrees to proceed.            This document has been electronically signed by Singh Rivera MD on March 17, 2020 16:00

## 2020-03-17 NOTE — PATIENT INSTRUCTIONS

## 2020-03-18 PROBLEM — K74.60 CIRRHOSIS OF LIVER WITH ASCITES (HCC): Status: ACTIVE | Noted: 2020-01-01

## 2020-03-18 PROBLEM — E55.9 VITAMIN D DEFICIENCY: Status: ACTIVE | Noted: 2020-01-01

## 2020-03-18 PROBLEM — E43 SEVERE MALNUTRITION (HCC): Status: ACTIVE | Noted: 2020-01-01

## 2020-03-18 PROBLEM — K74.60 CHRONIC HEPATITIS C WITH CIRRHOSIS (HCC): Chronic | Status: ACTIVE | Noted: 2020-01-01

## 2020-03-18 PROBLEM — F01.50 VASCULAR DEMENTIA WITHOUT BEHAVIORAL DISTURBANCE (HCC): Status: ACTIVE | Noted: 2020-01-01

## 2020-03-18 PROBLEM — S31.000A SACRAL WOUND: Status: ACTIVE | Noted: 2020-01-01

## 2020-03-18 PROBLEM — I25.10 CORONARY ARTERY DISEASE INVOLVING NATIVE CORONARY ARTERY OF NATIVE HEART WITHOUT ANGINA PECTORIS: Status: ACTIVE | Noted: 2020-01-01

## 2020-03-18 PROBLEM — B18.2 CHRONIC HEPATITIS C WITH CIRRHOSIS (HCC): Chronic | Status: ACTIVE | Noted: 2020-01-01

## 2020-03-18 PROBLEM — R64 CACHEXIA (HCC): Status: ACTIVE | Noted: 2020-01-01

## 2020-03-18 PROBLEM — K74.69 DECOMPENSATED LIVER DISEASE (HCC): Status: ACTIVE | Noted: 2020-01-01

## 2020-03-18 PROBLEM — B18.2 CHRONIC HEPATITIS C WITH CIRRHOSIS (HCC): Status: ACTIVE | Noted: 2020-01-01

## 2020-03-18 PROBLEM — K74.60 CHRONIC HEPATITIS C WITH CIRRHOSIS (HCC): Status: ACTIVE | Noted: 2020-01-01

## 2020-03-18 PROBLEM — B18.2: Status: ACTIVE | Noted: 2020-01-01

## 2020-03-18 PROBLEM — R53.81 PHYSICAL DECONDITIONING: Status: ACTIVE | Noted: 2020-01-01

## 2020-03-18 PROBLEM — B18.2 CHRONIC HEPATITIS C VIRUS GENOTYPE 1A INFECTION (HCC): Chronic | Status: ACTIVE | Noted: 2020-01-01

## 2020-03-18 NOTE — PLAN OF CARE
"  Problem: Patient Care Overview  Goal: Plan of Care Review  Outcome: Ongoing (interventions implemented as appropriate)  Flowsheets (Taken 3/18/2020 1557)  Plan of Care Reviewed With: patient; family  Note:   Wt decreasing slowly down to 130-135#- wt is up with \"fluid in his belly\". Meets criteria for severe, chronic malnutrition. Family states h/o Megace but did not seem effective. MD may need consider another appetite stimulant. RD following.     "

## 2020-03-18 NOTE — SIGNIFICANT NOTE
03/18/20 0909   Rehab Time/Intention   Evaluation Not Performed patient unavailable for evaluation  (Attempted OT evaluation this date. RN reporting pt currently out of room for testing. Will check back as time allows.)   Rehab Treatment   Discipline occupational therapist

## 2020-03-18 NOTE — CONSULTS
SUBJECTIVE:   3/18/2020    Name: Jonnathan Albert  DOD: 1945    REASON FOR CONSULT: Cirrhosis with ascites    Chief Complaint:     Chief Complaint   Patient presents with   • Abdominal Pain   • Bloated       Subjective     Patient is 75 y.o. male with past medical history of dementia, hypertension, CVA, chronic hepatitis C with cirrhosis presents with increasing abdominal girth a few months duration.  No history of abdominal pain, nausea, vomiting, diarrhea, constipation, rectal bleeding or weight loss.  Has incontinence to both feces and urine.  No history of alcohol usage for his wife.  Patient had paracentesis performed since admission and 6 and half liters of ascitic fluid removal.  Patient's wife admits that difficulty caring for him at home but not interested in placement at this time.  Patient is nonverbal and is unable to provide history.   ROS/HISTORY/ CURRENT MEDICATIONS/OBJECTIVE/VS/PE:   Review of Systems:   Review of Systems  Unable to obtain since patient is nonverbal     History:     Past Medical History:   Diagnosis Date   • Anxiety state    • Chronic hepatitis C (CMS/HCC)    • Dementia (CMS/HCC)    • Depressive disorder    • Encounter for screening for malignant neoplasm of prostate    • Epigastric pain    • Essential hypertension    • Heartburn    • Impacted cerumen    • Prostatitis    • Special screening for malignant neoplasm of colon    • Stroke (CMS/HCC)    • Urinary tract infectious disease    • Vascular insufficiency      Past Surgical History:   Procedure Laterality Date   • VASCULAR SURGERY       Family History   Problem Relation Age of Onset   • Dementia Other    • Diabetes Other    • Heart disease Other    • Hypertension Other    • Alcohol abuse Other    • Lung cancer Other    • Rheum arthritis Other    • Stroke Other      Social History     Tobacco Use   • Smoking status: Former Smoker   • Smokeless tobacco: Never Used   Substance Use Topics   • Alcohol use: Not Currently   • Drug  use: Not Currently     Types: Heroin     Medications Prior to Admission   Medication Sig Dispense Refill Last Dose   • amLODIPine (NORVASC) 5 MG tablet Take 1 tablet by mouth Daily. 90 tablet 1 Taking   • aspirin 81 MG chewable tablet Chew 1 tablet Daily. 90 tablet 1 Taking   • clopidogrel (PLAVIX) 75 MG tablet Take 1 tablet by mouth Daily. 90 tablet 1 Taking   • famotidine (PEPCID) 40 MG tablet Take 1 tablet by mouth Daily. 90 tablet 3 Taking   • memantine (NAMENDA) 5 MG tablet Take 1 tablet by mouth 2 (Two) Times a Day. 180 tablet 1 Taking   • MILK THISTLE PO Take  by mouth.   Taking   • Misc. Devices (COMMODE BEDSIDE) misc Bedside commode to be used as needed due to difficulty with ambulation and mobility. 1 each 0 Taking   • ramipril (ALTACE) 5 MG capsule TAKE 1 CAPSULE DAILY 90 capsule 1 Taking   • tamsulosin (FLOMAX) 0.4 MG capsule 24 hr capsule Take 2 capsules by mouth Every Night. 180 capsule 1 Taking   • levothyroxine (SYNTHROID) 50 MCG tablet Take 1 tablet by mouth Daily. (Patient taking differently: Take 25 mcg by mouth Daily.) 30 tablet 3 Taking   • miconazole (LOTRIMIN AF) 2 % powder Apply  topically to the appropriate area as directed As Needed for Itching. 85 g 5 Taking   • TRACE MIN CACRCUFEKMGMNPSEZN PO Take 1 tablet by mouth Daily.   Taking   • vitamin D (ERGOCALCIFEROL) 1.25 MG (64081 UT) capsule capsule Take 1 capsule by mouth Every 7 (Seven) Days. 4 capsule 3 Taking     Allergies:  Patient has no known allergies.    I have reviewed the patient's medical history, surgical history and family history in the available medical record system.     Current Medications:     Current Facility-Administered Medications   Medication Dose Route Frequency Provider Last Rate Last Dose   • amLODIPine (NORVASC) tablet 5 mg  5 mg Oral Daily Bruce Julien III, MD   5 mg at 03/18/20 1058   • aspirin chewable tablet 81 mg  81 mg Oral Daily Bruce Julien III, MD   81 mg at 03/18/20 1058   • cefTRIAXone  (ROCEPHIN) 2 g/100 mL 0.9% NS VTB (VIRGINIE)  2 g Intravenous Q24H Steffany Child MD   2 g at 03/18/20 1700   • cholecalciferol (VITAMIN D3) tablet 2,000 Units  2,000 Units Oral Daily Bruce Julien III, MD   2,000 Units at 03/18/20 1058   • famotidine (PEPCID) tablet 40 mg  40 mg Oral Daily Bruce Julien III, MD   40 mg at 03/18/20 1057   • furosemide (LASIX) tablet 40 mg  40 mg Oral Daily Bruce Julien III, MD   40 mg at 03/18/20 1058   • [START ON 3/19/2020] levothyroxine (SYNTHROID, LEVOTHROID) tablet 50 mcg  50 mcg Oral Q AM Steffany Child MD       • memantine (NAMENDA) tablet 5 mg  5 mg Oral BID Bruce Julien III, MD   5 mg at 03/18/20 1057   • morphine injection 2 mg  2 mg Intravenous Q4H PRN Steffany Child MD       • ondansetron (ZOFRAN) tablet 4 mg  4 mg Oral Q6H PRN Bruce Julien III, MD        Or   • ondansetron (ZOFRAN) injection 4 mg  4 mg Intravenous Q6H PRN Bruce Julien III, MD       • ramipril (ALTACE) capsule 5 mg  5 mg Oral Daily Bruce Julien III, MD   5 mg at 03/18/20 1058   • sodium chloride 0.9 % flush 10 mL  10 mL Intravenous PRN Bruce Julien III, MD       • sodium chloride 0.9 % flush 10 mL  10 mL Intravenous Q12H Bruce Julien III, MD   10 mL at 03/18/20 1059   • sodium chloride 0.9 % flush 10 mL  10 mL Intravenous PRN Bruce Julien III, MD       • tamsulosin (FLOMAX) 24 hr capsule 0.8 mg  0.8 mg Oral Nightly Bruce Julien III, MD           Objective     Physical Exam:   Temp:  [96.3 °F (35.7 °C)-98.2 °F (36.8 °C)] 98.2 °F (36.8 °C)  Heart Rate:  [] 83  Resp:  [18-20] 18  BP: (122-179)/() 126/83    Physical Exam:  General Appearance:    Alert, cooperative, in no acute distress   Head:    Normocephalic, without obvious abnormality, atraumatic   Eyes:            Lids and lashes normal, conjunctivae and sclerae normal, no   icterus, no pallor, corneas clear, PERRLA   Ears:    Ears appear intact with no  abnormalities noted   Throat:   No oral lesions, no thrush, oral mucosa moist   Neck:   No adenopathy, supple, trachea midline, no thyromegaly, no     carotid bruit, no JVD   Back:     No kyphosis present, no scoliosis present, no skin lesions,       erythema or scars, no tenderness to percussion or                   palpation,   range of motion normal   Lungs:     Clear to auscultation,respirations regular, even and                   unlabored    Heart:    Regular rhythm and normal rate, normal S1 and S2, no            murmur, no gallop, no rub, no click   Breast Exam:    Deferred   Abdomen:     Normal bowel sounds, no masses, no organomegaly, soft        non-tender, non-distended, no guarding, no rebound                 tenderness   Genitalia:    Deferred   Extremities:   Moves all extremities well, no edema, no cyanosis, no              redness   Pulses:   Pulses palpable and equal bilaterally   Skin:   No bleeding, bruising or rash   Lymph nodes:   No palpable adenopathy   Neurologic:   Cranial nerves 2 - 12 grossly intact, sensation intact, DTR        present and equal bilaterally      Results Review:     Lab Results   Component Value Date    WBC 1.75 (C) 03/18/2020    WBC 1.93 (C) 03/17/2020    WBC 2.23 (L) 03/09/2020    HGB 9.3 (L) 03/18/2020    HGB 9.8 (L) 03/17/2020    HGB 10.7 (L) 03/09/2020    HCT 25.6 (L) 03/18/2020    HCT 27.9 (L) 03/17/2020    HCT 32.1 (L) 03/09/2020    PLT 86 (L) 03/18/2020    PLT 93 (L) 03/17/2020     (L) 03/09/2020     Results from last 7 days   Lab Units 03/17/20 2023   ALK PHOS U/L 42   ALT (SGPT) U/L 9   AST (SGOT) U/L 27     Results from last 7 days   Lab Units 03/17/20 2023   BILIRUBIN mg/dL 0.5   ALK PHOS U/L 42     Lipase   Date Value Ref Range Status   03/17/2020 18 13 - 60 U/L Final     Lab Results   Component Value Date    INR 1.37 (H) 03/17/2020    INR 1.2 08/03/2015    INR 1.3 08/01/2015         Radiology Review:  Imaging Results (Last 72 Hours)     Procedure  Component Value Units Date/Time    US Paracentesis [[id number]] Collected:  03/18/20 0912    Specimen:  Body Fluid Updated:  03/18/20 1054    Narrative:       Procedure: Ultrasound directed paracentesis.    Reason for exam: Severe ascites.    FINDINGS: Patient presents for ultrasound directed paracentesis.  Procedure, risks, and benefits were explained to the patient and  his wife and the wife gave informed consent. Ultrasound was  utilized to find appropriate access point for paracentesis. This  was identified and skin surface was marked. The patient was  sterilely prepped and draped and locally anesthetized lidocaine.  Utilizing ultrasound guidance a Yueh catheter was placed within  the ascitic fluid. A total of 6.8 L of yellowish ascitic fluid  was aspirated. The Yueh catheter was then removed. Patient  tolerated procedure well. No immediate complications.      Impression:       1.  Successful ultrasound directed paracentesis with removal of  6.8 L of yellowish ascitic fluid. Fluid sent to lab for analysis.    Electronically signed by:  Kristian Bacon MD  3/18/2020 10:53 AM CDT  Workstation: [id number]    CT Abdomen Pelvis With Contrast [[id number]] Collected:  03/17/20 2147     Updated:  03/17/20 2228    Narrative:       Exam: CT abdomen pelvis with contrast    INDICATION: Abdominal distention    TECHNIQUE: Routine CT abdomen pelvis without contrast. Sagittal  coronal reconstructions were obtained. This exam was performed  according to our departmental dose-optimization program, which  includes automated exposure control, adjustment of the mA and/or  kV according to patient size and/or use of iterative  reconstruction technique.      Impression:       Nonspecific elevation of the right hemidiaphragm.  Mild atelectasis is present in the right lung base. There is a  small right pleural effusion.    The liver is small with a nodular contour compatible with  cirrhosis. There is a small stone in the gallbladder. The  spleen  is not enlarged. Pancreas and adrenal glands are unremarkable.  There are bilateral renal cysts. No urinary tract calculus or  hydronephrosis.    Plaque is present in the abdominal aorta. No aneurysm. There is  massive ascites. No bowel obstruction or obvious abnormal bowel  wall thickening. Diverticular disease is present.    The bladder is unremarkable.    There are degenerative changes along the thoracolumbar spine    IMPRESSION:  1. Severe ascites. Recommend clinical correlation.  2. Findings compatible prior cirrhosis  3. Cholelithiasis  4. Diverticulosis  5. Mild right basilar atelectasis with small right pleural  effusion    Electronically signed by:  Jevon Olivera MD  3/17/2020 10:27 PM  CDT Workstation: [id number]    CT Head Without Contrast [[id number]] Collected:  03/17/20 2147     Updated:  03/17/20 2218    Narrative:       Exam is to without contrast    INDICATION: Confusion state    COMPARISON: 7/15/2015    FINDINGS: Routine head CT without contrast. Sagittal coronal  reconstructions were obtained. This exam was performed according  to our departmental dose-optimization program, which includes  automated exposure control, adjustment of the mA and/or kV  according to patient size and/or use of iterative reconstruction  technique.    FINDINGS: The bony calvarium is intact. The imaged paranasal  sinuses and mastoid air cells are clear. Plaque is present in the  intracranial arteries. No extra-axial fluid collection.  Enlargement of the ventricles and sulci compatible with  age-related atrophy. Gray-white differentiation is maintained.  Mild to moderate chronic ischemic change. No obvious sign of  acute infarction. No intraparenchymal hemorrhage, mass or midline      Impression:       No obvious intracranial abnormality. Recommend  follow-up as clinically warranted.    Electronically signed by:  Jevon Olivera MD  3/17/2020 10:17 PM  CDT Workstation: [id number]    XR Chest 1 View [[id number]] Collected:   03/17/20 2055     Updated:  03/17/20 2118    Narrative:       Exam: AP portable chest    INDICATION: Dyspnea    COMPARISON: 7/15/2015    FINDINGS: AP portable chest. There are metallic fragments  projecting over the left shoulder joint. The the  cardiomediastinal silhouette is unremarkable. Nonspecific  elevation of the right hemidiaphragm. Mild right basilar  atelectasis and/or scarring. Left lung is clear.      Impression:       Nonspecific elevation of the right hemidiaphragm with  right basilar atelectasis.    Electronically signed by:  Jevon Olivera MD  3/17/2020 9:17 PM  CDT Workstation: [id number]          I reviewed the patient's new clinical results.    I reviewed the patient's new imaging results and agree with the interpretation.     ASSESSMENT/PLAN:   ASSESSMENT: 1.  Ascites, status post large-volume paracentesis.  2.  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  Dementia  4.  Urinary and fecal incontinence, likely due to dementia and previous CVA    PLAN: No added salt diet.  Lasix and Aldactone  Recommend placement  Hepatitis C therapy with Epclusa or Harvoni post clinical stabilization    The risks, benefits, and alternatives of this procedure have been discussed with the patient or the responsible party. The patient understands and agrees to proceed.         Singh Rivera MD  03/18/20  17:16

## 2020-03-18 NOTE — THERAPY EVALUATION
Patient Name: Jonnathan Albert  : 1945    MRN: [id number]                              Today's Date: 3/18/2020       Admit Date: 3/17/2020    Visit Dx:     ICD-10-CM ICD-9-CM   1. Poorly controlled ascites R18.8 V49.89   2. Impaired functional mobility, balance, gait, and endurance Z74.09 V49.89     Patient Active Problem List   Diagnosis   • Essential hypertension   • Cerebrovascular accident (CVA) (CMS/HCC)   • Weakness of left leg   • Gastroesophageal reflux disease   • Benign prostatic hyperplasia   • Rash   • At high risk for falls   • Gait instability   • Impaired fasting glucose   • Vascular dementia with behavior disturbance (CMS/HCC)   • Cerebral atrophy (CMS/HCC)   • Acquired hypothyroidism   • Hepatic cirrhosis (CMS/HCC)   • Cirrhosis of liver with ascites (CMS/HCC)   • Coronary artery disease involving native coronary artery of native heart without angina pectoris   • Vitamin D deficiency   • Vascular dementia without behavioral disturbance (CMS/HCC)   • Cachexia (CMS/HCC)   • Physical deconditioning   • Sacral wound   • Chronic hepatitis C with cirrhosis (CMS/HCC)   • Chronic hepatitis C virus genotype 1a infection (CMS/HCC)     Past Medical History:   Diagnosis Date   • Anxiety state    • Chronic hepatitis C (CMS/HCC)    • Dementia (CMS/HCC)    • Depressive disorder    • Encounter for screening for malignant neoplasm of prostate    • Epigastric pain    • Essential hypertension    • Heartburn    • Impacted cerumen    • Prostatitis    • Special screening for malignant neoplasm of colon    • Stroke (CMS/HCC)    • Urinary tract infectious disease    • Vascular insufficiency      Past Surgical History:   Procedure Laterality Date   • VASCULAR SURGERY       General Information     Row Name 20 1415          PT Evaluation Time/Intention    Document Type  evaluation  -GERMAN     Mode of Treatment  individual therapy;physical therapy  -GERMAN     Row Name 20 1415          General Information     Patient Profile Reviewed?  yes  -GERMAN     Prior Level of Function  independent:;gait;transfer;mod assist:;max assist:;ADL's;dependent:;driving;home management wife reports pt mostly just walking with cane to/from bathroom and to/from kitchen for meals since November  -     Existing Precautions/Restrictions  fall  -     Barriers to Rehab  medically complex;previous functional deficit  -     Row Name 03/18/20 1415          Relationship/Environment    Lives With  spouse  -     Row Name 03/18/20 1415          Resource/Environmental Concerns    Current Living Arrangements  home/apartment/condo  -     Row Name 03/18/20 1415          Home Main Entrance    Number of Stairs, Main Entrance  two  -GERMAN     Stair Railings, Main Entrance  none  -     Row Name 03/18/20 1415          Stairs Within Home, Primary    Number of Stairs, Within Home, Primary  none  -     Row Name 03/18/20 1415          Cognitive Assessment/Intervention- PT/OT    Orientation Status (Cognition)  oriented to;person;place  -     Row Name 03/18/20 1415          Safety Issues, Functional Mobility    Safety Issues Affecting Function (Mobility)  safety precaution awareness;safety precautions follow-through/compliance  -     Impairments Affecting Function (Mobility)  cognition;endurance/activity tolerance;strength  -       User Key  (r) = Recorded By, (t) = Taken By, (c) = Cosigned By    Initials Name Provider Type    Camille Morillo, PT Physical Therapist        Mobility     Row Name 03/18/20 1455 03/18/20 1415       Bed Mobility Assessment/Treatment    Bed Mobility Assessment/Treatment  --  -GERMAN  rolling left;rolling right;scooting/bridging;supine-sit;sit-supine  -    Rolling Left [place] (Bed Mobility)  --  -GERMAN  not tested  -    Rolling Right [place] (Bed Mobility)  --  -GERMAN  minimum assist (75% patient effort)  -    Scooting/Bridging [place] (Bed Mobility)  --  dependent (less than 25% patient effort);2 person assist  -GERMAN     Supine-Sit [place] (Bed Mobility)  --  moderate assist (50% patient effort);verbal cues;nonverbal cues (demo/gesture)  -    Sit-Supine [place] (Bed Mobility)  --  -GERMAN  moderate assist (50% patient effort);verbal cues;nonverbal cues (demo/gesture)  -    Supine-Sit-Supine [place] (Bed Mobility)  --  -GERMAN  --    Assistive Device (Bed Mobility)  --  -GERMAN  --    Comment (Bed Mobility)  Pt sat EOB 5 minutes with SBA for sitting balance  -  Pt sat EOB 5 minutes with SBA for sitting balance  -    Row Name 03/18/20 1455 03/18/20 1415       Transfer Assessment/Treatment    Comment (Transfers)  --  -  Pt declined standing, ambulation due to fatigue from paracentesis ealier;pt wanted to rest  -[facility] Name 03/18/20 1455 03/18/20 1415       Bed-Chair Transfer    Bed-Chair [place] (Transfers)  --  -GERMAN  not tested  -[facility] Name 03/18/20 1455 03/18/20 1415       Sit-Stand Transfer    Sit-Stand [place] (Transfers)  --  -GERMAN  not tested  -[facility] Name 03/18/20 145 03/18/20 1415       Gait/Stairs Assessment/Training    [place] Level (Gait)  --  -GERMAN  not tested  -      User Key  (r) = Recorded By, (t) = Taken By, (c) = Cosigned By    Initials Name Provider Type    GERMAN Camille Denney PT Physical Therapist        Obj/Interventions     Row Name 03/18/20 1415          General ROM    GENERAL ROM COMMENTS  AROM WFL all extremities except endrange shoulder flexion  -     Row Name 03/18/20 1415          MMT (Manual Muscle Testing)    General MMT Comments  BLE: 3+/5 ankles/feet, 3/5 knees, hips  -     Row Name 03/18/20 1415          Static Sitting Balance    Level of [place] (Unsupported Sitting, Static Balance)  standby assist  -     Sitting Position (Unsupported Sitting, Static Balance)  sitting on edge of bed  -     Time Able to Maintain Position (Unsupported Sitting, Static Balance)  4 to 5 minutes  -     Row Name 03/18/20 1415          Dynamic Sitting Balance    Level of  [place], Reaches Outside Midline (Sitting, Dynamic Balance)  contact guard assist  -     Sitting Position, Reaches Outside Midline (Sitting, Dynamic Balance)  sitting on edge of bed  -     Row Name 03/18/20 1415          Sensory Assessment/Intervention    Sensory General Assessment  no sensation deficits identified to light touch  -       User Key  (r) = Recorded By, (t) = Taken By, (c) = Cosigned By    Initials Name Provider Type     Camille Denney, PT Physical Therapist        Goals/Plan     Row Name 03/18/20 1415          Bed Mobility Goal 1 (PT)    Activity/Assistive Device (Bed Mobility Goal 1, PT)  sit to supine;supine to sit  -     [place] Level/Cues Needed (Bed Mobility Goal 1, PT)  contact guard assist;standby assist  -     Time Frame (Bed Mobility Goal 1, PT)  by discharge  -     Progress/Outcomes (Bed Mobility Goal 1, PT)  goal not met  -     Row Name 03/18/20 1415          Transfer Goal 1 (PT)    Activity/Assistive Device (Transfer Goal 1, PT)  sit-to-stand/stand-to-sit;bed-to-chair/chair-to-bed  -     [place] Level/Cues Needed (Transfer Goal 1, PT)  contact guard assist  -     Time Frame (Transfer Goal 1, PT)  by discharge  -GERMAN     Progress/Outcome (Transfer Goal 1, PT)  goal not met  -     Row Name 03/18/20 1415          Gait Training Goal 1 (PT)    Activity/Assistive Device (Gait Training Goal 1, PT)  gait (walking locomotion);assistive device use  -     [place] Level (Gait Training Goal 1, PT)  contact guard assist;minimum assist (75% or more patient effort)  -     Time Frame (Gait Training Goal 1, PT)  by discharge  -     Barriers (Gait Training Goal 1, PT)  50ftx2  -     Progress/Outcome (Gait Training Goal 1, PT)  goal not met  -     Row Name 03/18/20 1415          Stairs Goal 1 (PT)    Activity/Assistive Device (Stairs Goal 1, PT)  ascending stairs;descending stairs;assistive device use  -     [place] Level/Cues Needed (Stairs Goal 1, PT)   contact guard assist  -GERMAN     Time Frame (Stairs Goal 1, PT)  by discharge  -     Progress/Outcome (Stairs Goal 1, PT)  goal not met  -       User Key  (r) = Recorded By, (t) = Taken By, (c) = Cosigned By    Initials Name Provider Type    Camille Morillo, PT Physical Therapist        Clinical Impression     Row Name 03/18/20 1415          Pain Assessment    Additional Documentation  Pain Scale: Numbers Pre/Post-Treatment (Group)  -GERMAN     Row Name 03/18/20 1415          Pain Scale: Numbers Pre/Post-Treatment    Pain Scale: Numbers, Pretreatment  0/10 - no pain  -GERMAN     Pain Scale: Numbers, Post-Treatment  0/10 - no pain  -GERMAN     Row Name 03/18/20 1410          Plan of Care Review    Plan of Care Reviewed With  patient;spouse  -     Outcome Summary  PT evaluation completed. Pt required encouragement to participate. Pt rolled with min assistance and transferred supine to sit to supine with moderate assistance of 1 with verbal cues. Pt declined standing/ambulation stating he wanted to sleep. Function llimited by decreased strength, balance, and tolerance for functional mobility and activities. Pt will benefit from PT to regain lost function. Pt will require 24/7 care at discharge with continued therapy services.  -     Row Name 03/18/20 1415          Physical Therapy Clinical Impression    Patient/Family Goals Statement (PT Clinical Impression)  pt not cognitively able to participate. Wife would like pt to be stronger and up more and walking  -     Criteria for Skilled Interventions Met (PT Clinical Impression)  yes;treatment indicated  -     Rehab Potential (PT Clinical Summary)  fair, will monitor progress closely  -     Predicted Duration of Therapy (PT)  until discharge or goals met  -     Row Name 03/18/20 1414          Vital Signs    Pre Systolic BP Rehab  120  -GERMAN     Pre Treatment Diastolic BP  68  -GERMAN     Pretreatment Heart Rate (beats/min)  77  -GERMAN     Posttreatment Heart Rate (beats/min)   76  -GERMAN     Pre SpO2 (%)  99  -GERMAN     O2 Delivery Pre Treatment  room air  -GERMAN     Post SpO2 (%)  95  -GERMAN     O2 Delivery Post Treatment  room air  -GERMAN     Pre Patient Position  Supine  -GERMAN     Post Patient Position  Supine  -GERMAN     Row Name 03/18/20 1415          Positioning and Restraints    Pre-Treatment Position  in bed  -GERMAN     Post Treatment Position  bed  -GERMAN     In Bed  call light within reach;supine;encouraged to call for assist;exit alarm on;with family/caregiver;side rails up x2  -       User Key  (r) = Recorded By, (t) = Taken By, (c) = Cosigned By    Initials Name Provider Type    Camille Morillo, PT Physical Therapist        Outcome Measures     Row Name 03/18/20 1415          How much help from another person do you currently need...    Turning from your back to your side while in flat bed without using bedrails?  3  -GERMAN     Moving from lying on back to sitting on the side of a flat bed without bedrails?  2  -GERMAN     Moving to and from a bed to a chair (including a wheelchair)?  2  -GERMAN     Standing up from a chair using your arms (e.g., wheelchair, bedside chair)?  2  -GERMAN     Climbing 3-5 steps with a railing?  1  -GERMAN     To walk in hospital room?  2  -GERMAN     AM-PAC 6 Clicks Score (PT)  12  -GERMAN     Row Name 03/18/20 1415          Functional Assessment    Outcome Measure Options  AM-PAC 6 Clicks Basic Mobility (PT)  -GERMAN       User Key  (r) = Recorded By, (t) = Taken By, (c) = Cosigned By    Initials Name Provider Type    Camille Morillo, PT Physical Therapist          PT Recommendation and Plan  Planned Therapy Interventions (PT Eval): balance training, bed mobility training, gait training, home exercise program, neuromuscular re-education, patient/family education, stair training, strengthening, transfer training  Outcome Summary/Treatment Plan (PT)  Anticipated Discharge Disposition (PT): anticipate therapy at next level of care, home with home health, home with 24/7 care, skilled nursing  facility  Plan of Care Reviewed With: patient, spouse  Outcome Summary: PT evaluation completed. Pt rolled with min assistance and transferred supine to sit to supine with moderate assistance. Pt sat EOB 5 mnutes with SBA for sitting balance. Pt declined standing/walking due to fatigue following paracentesis earlier. Function limited by decreased strength, balance and tolerance for functional mobility and actvities. Pt will benefit from PT to regain lost function. Anticipate 24/7 care at discharge with continued therapy services.     Time Calculation:   PT Charges     Row Name 03/18/20 1642             Time Calculation    Start Time  1415  -GERMAN      Stop Time  1502  -      Time Calculation (min)  47 min  -      PT Received On  03/18/20  -      PT Goal Re-Cert Due Date  03/31/20  -         Time Calculation- PT    Total Timed Code Minutes- PT  30 minute(s)  -        User Key  (r) = Recorded By, (t) = Taken By, (c) = Cosigned By    Initials Name Provider Type    GERMAN Camille Denney, PT Physical Therapist        Therapy Charges for Today     Code Description Service Date Service Provider Modifiers Qty    33132885708 HC PT EVAL MOD COMPLEXITY 1 3/18/2020 Camille Denney, PT GP 1    46686014206 HC PT THERAPEUTIC ACT EA 15 MIN 3/18/2020 Camille Denney, PT GP 2          PT G-Codes  Outcome Measure Options: AM-PAC 6 Clicks Basic Mobility (PT)  AM-PAC 6 Clicks Score (PT): 12    Camille Denney PT  3/18/2020

## 2020-03-18 NOTE — H&P
HISTORY AND PHYSICAL  NAME: Jonnathan Albert  : 1945  MRN: [id number]    DATE OF ADMISSION:  3/17/2020     DATE & TIME SEEN: 20 at 2352 hrs.    PCP: Maxi Morales MD    CODE STATUS: Full code    CHIEF COMPLAINT: Abdominal pain    HPI:  Jonnathan Albert is a 75 y.o. 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  Patient without fever or chills and is continues to be altered with a history of vascular dementia.  History provided by wife.  Wife is primary caregiver and guardian legally.  Patient has not undergone treatment for his hepatitis C nor serotyping or genotyping.  Patient with transient periods of lucidity.  Patient saw Dr. Rivera of GI today who recommended p.o. diuresis for this at this time.  Her completed note was not successfully written in the EMR at this time.  Patient also with recently healed sacral ulcer as he has been bedbound since November.  Patient been doing castor oil for this.  Patient's wife denies melena hematochezia chest discomfort fever and chills or noticeable peripheral edema to her.    CONCURRENT MEDICAL HISTORY:  Past Medical History:   Diagnosis Date   • Anxiety state    • Chronic hepatitis C (CMS/HCC)    • Dementia (CMS/HCC)    • Depressive disorder    • Encounter for screening for malignant neoplasm of prostate    • Epigastric pain    • Essential hypertension    • Heartburn    • Impacted cerumen    • Prostatitis    • Special screening for malignant neoplasm of colon    • Stroke (CMS/HCC)    • Urinary tract infectious disease    • Vascular insufficiency        PAST SURGICAL HISTORY:  Past Surgical History:   Procedure Laterality Date   • VASCULAR SURGERY         FAMILY HISTORY:  Family History   Problem Relation Age of Onset   • Dementia Other    • Diabetes Other    • Heart disease Other    • Hypertension Other     • Alcohol abuse Other    • Lung cancer Other    • Rheum arthritis Other    • Stroke Other         SOCIAL HISTORY:  Social History     Socioeconomic History   • Marital status:      Spouse name: Not on file   • Number of children: Not on file   • Years of education: Not on file   • Highest education level: Not on file   Tobacco Use   • Smoking status: Former Smoker   • Smokeless tobacco: Never Used   Substance and Sexual Activity   • Alcohol use: Not Currently   • Drug use: Not Currently     Types: Heroin   • Sexual activity: Defer       HOME MEDICATIONS:  Prior to Admission medications    Medication Sig Start Date End Date Taking? Authorizing Provider   amLODIPine (NORVASC) 5 MG tablet Take 1 tablet by mouth Daily. 5/20/19  Yes Lucille Vinson APRN   aspirin 81 MG chewable tablet Chew 1 tablet Daily. 1/24/19  Yes Lucille Vinson APRN   clopidogrel (PLAVIX) 75 MG tablet Take 1 tablet by mouth Daily. 5/20/19  Yes Lucille Vinson APRN   famotidine (PEPCID) 40 MG tablet Take 1 tablet by mouth Daily. 11/21/19  Yes Lucille Vinson APRN   memantine (NAMENDA) 5 MG tablet Take 1 tablet by mouth 2 (Two) Times a Day. 1/24/19  Yes Lucille Vinson APRN   MILK THISTLE PO Take  by mouth.   Yes Provider, MD Danielle   Misc. Devices (COMMODE BEDSIDE) [facility] Bedside commode to be used as needed due to difficulty with ambulation and mobility. 1/24/20  Yes Lucille Vinson APRN   ramipril (ALTACE) 5 MG capsule TAKE 1 CAPSULE DAILY 7/2/19  Yes Lucille Vinson APRN   tamsulosin (FLOMAX) 0.4 MG capsule 24 hr capsule Take 2 capsules by mouth Every Night. 3/6/20  Yes Maxi Morales MD   levothyroxine (SYNTHROID) 50 MCG tablet Take 1 tablet by mouth Daily.  Patient taking differently: Take 25 mcg by mouth Daily. 3/16/20   Maxi Morales MD   miconazole (LOTRIMIN AF) 2 % powder Apply  topically to the appropriate area as directed As Needed for Itching. 1/24/19   Lucille Vinson APRN   TRACE MIN  CACRCUFEKMGMNPSEZN PO Take 1 tablet by mouth Daily.    Provider, MD Danielle   vitamin D (ERGOCALCIFEROL) 1.25 MG (72341 UT) capsule capsule Take 1 capsule by mouth Every 7 (Seven) Days. 3/16/20   Maxi Morales MD       ALLERGIES:  Patient has no known allergies.    REVIEW OF SYSTEMS  Review of Systems   Unable to perform ROS: Dementia       PHYSICAL EXAM:  Temp:  [96.6 °F (35.9 °C)-97.9 °F (36.6 °C)] 96.6 °F (35.9 °C)  Heart Rate:  [] 83  Resp:  [18-20] 18  BP: (111-179)/() 142/86  Body mass index is 22.82 kg/m².  Physical Exam   Constitutional: No distress.   HENT:   Head: Normocephalic and atraumatic.   Right Ear: External ear normal.   Left Ear: External ear normal.   Nose: Nose normal.   Eyes: Pupils are equal, round, and reactive to light. Conjunctivae and EOM are normal. Right eye exhibits no discharge. Left eye exhibits no discharge. No scleral icterus.   Neck: Normal range of motion. Neck supple. No thyromegaly present.   Cardiovascular: Normal rate, regular rhythm, normal heart sounds and intact distal pulses. Exam reveals no gallop and no friction rub.   No murmur heard.  Pulmonary/Chest: Effort normal and breath sounds normal. No respiratory distress. He has no wheezes. He has no rales. He exhibits no tenderness.   Abdominal: Soft. Bowel sounds are normal. He exhibits no distension and no mass. There is no tenderness. There is no guarding.   Musculoskeletal: Normal range of motion. He exhibits edema (2+ pitting edema to knee bilaterally). He exhibits no tenderness or deformity.   Lymphadenopathy:     He has no cervical adenopathy.   Neurological: No cranial nerve deficit.   Skin: Skin is warm and dry. Capillary refill takes 2 to 3 seconds. Rash (Scaly dry healed sacral ulcer with no surrounding sacral edema erythema or significant skin breakdown) noted. He is not diaphoretic.       DIAGNOSTIC DATA:   Lab Results (last 24 hours)     Procedure Component Value Units Date/Time    Urinalysis  With Microscopic If Indicated (No Culture) - Urine, Clean Catch [[id number]]  (Abnormal) Collected:  03/17/20 2107    Specimen:  Urine, Clean Catch Updated:  03/17/20 2132     Color, UA Dark Yellow     Appearance, UA Clear     pH, UA 5.5     Specific Gravity, UA 1.023     Glucose, UA Negative     Ketones, UA Negative     Bilirubin, UA Small (1+)     Blood, UA Negative     Protein, UA 30 mg/dL (1+)     Leuk Esterase, UA Negative     Nitrite, UA Negative     Urobilinogen, UA 2.0 E.U./dL    Urinalysis, Microscopic Only - Urine, Clean Catch [[id number]]  (Abnormal) Collected:  03/17/20 2107    Specimen:  Urine, Clean Catch Updated:  03/17/20 2132     RBC, UA 0-2 /HPF      WBC, UA 0-2 /HPF      Bacteria, UA None Seen /HPF      Squamous Epithelial Cells, UA None Seen /HPF      Hyaline Casts, UA 7-12 /LPF      Methodology Automated Microscopy    Manual Differential [[id number]]  (Abnormal) Collected:  03/17/20 2023    Specimen:  Blood Updated:  03/17/20 2131     Neutrophil % 52.0 %      Lymphocyte % 34.0 %      Monocyte % 12.0 %      Eosinophil % 2.0 %      Neutrophils Absolute 1.00 10*3/mm3      Lymphocytes Absolute 0.66 10*3/mm3      Monocytes Absolute 0.23 10*3/mm3      Eosinophils Absolute 0.04 10*3/mm3      Anisocytosis Slight/1+     Hypochromia Slight/1+     Target Cells Slight/1+     WBC Morphology Normal     Platelet Estimate Decreased    Extra Tubes [[id number]] Collected:  03/17/20 2023    Specimen:  Blood, Venous Line Updated:  03/17/20 2131    Narrative:       The following orders were created for panel order Extra Tubes.  Procedure                               Abnormality         Status                     ---------                               -----------         ------                     Lavender Top[[id number]]                                     Final result               Gold Top - [facility][[id number]]                                   Final result                 Please view results for these tests on the  individual orders.    Lavender Top [[id number]] Collected:  03/17/20 2023    Specimen:  Blood Updated:  03/17/20 2131     Extra Tube hold for add-on     Comment: Auto resulted       Gold Top - [facility] [[id number]] Collected:  03/17/20 2023    Specimen:  Blood Updated:  03/17/20 2131     Extra Tube Hold for add-ons.     Comment: Auto resulted.       Troponin [[id number]]  (Normal) Collected:  03/17/20 2023    Specimen:  Blood Updated:  03/17/20 2059     Troponin T 0.013 ng/mL     Narrative:       Troponin T Reference Range:  <= 0.03 ng/mL-   Negative for AMI  >0.03 ng/mL-     Abnormal for myocardial necrosis.  Clinicians would have to utilize clinical acumen, EKG, Troponin and serial changes to determine if it is an Acute Myocardial Infarction or myocardial injury due to an underlying chronic condition.       Results may be falsely decreased if patient taking Biotin.      TSH [[id number]]  (Abnormal) Collected:  03/17/20 2023    Specimen:  Blood Updated:  03/17/20 2059     TSH 5.070 uIU/mL     aPTT [[id number]]  (Normal) Collected:  03/17/20 2023    Specimen:  Blood Updated:  03/17/20 2057     PTT 28.2 seconds     Narrative:       The recommended Heparin therapeutic range is 68-97 seconds.    Protime-INR [[id number]]  (Abnormal) Collected:  03/17/20 2023    Specimen:  Blood Updated:  03/17/20 2057     Protime 16.7 Seconds      INR 1.37    Narrative:       Therapeutic range for most indications is 2.0-3.0 INR,  or 2.5-3.5 for mechanical heart valves.    Comprehensive Metabolic Panel [[id number]]  (Abnormal) Collected:  03/17/20 2023    Specimen:  Blood Updated:  03/17/20 2055     Glucose 89 mg/dL      BUN 19 mg/dL      Creatinine 1.02 mg/dL      Sodium 137 mmol/L      Potassium 4.1 mmol/L      Chloride 108 mmol/L      CO2 18.0 mmol/L      Calcium 8.0 mg/dL      Total Protein 7.3 g/dL      Albumin 2.60 g/dL      ALT (SGPT) 9 U/L      AST (SGOT) 27 U/L      Alkaline Phosphatase 42 U/L      Total Bilirubin 0.5 mg/dL      eGFR    Amer 86 mL/min/1.73      Globulin 4.7 gm/dL      A/G Ratio 0.6 g/dL      BUN/Creatinine Ratio 18.6     Anion Gap 11.0 mmol/L     Narrative:       GFR Normal >60  Chronic Kidney Disease <60  Kidney Failure <15      Lipase [[id number]]  (Normal) Collected:  03/17/20 2023    Specimen:  Blood Updated:  03/17/20 2054     Lipase 18 U/L     Ammonia [[id number]]  (Abnormal) Collected:  03/17/20 2023    Specimen:  Blood Updated:  03/17/20 2053     Ammonia 14 umol/L     CBC & Differential [[id number]] Collected:  03/17/20 2023    Specimen:  Blood Updated:  03/17/20 2034    Narrative:       The following orders were created for panel order CBC & Differential.  Procedure                               Abnormality         Status                     ---------                               -----------         ------                     CBC Auto Differential[[id number]]        Abnormal            Final result                 Please view results for these tests on the individual orders.    CBC Auto Differential [[id number]]  (Abnormal) Collected:  03/17/20 2023    Specimen:  Blood Updated:  03/17/20 2034     WBC 1.93 10*3/mm3      RBC 3.04 10*6/mm3      Hemoglobin 9.8 g/dL      Hematocrit 27.9 %      MCV 91.8 fL      MCH 32.2 pg      MCHC 35.1 g/dL      RDW 15.8 %      RDW-SD 53.0 fl      MPV 10.9 fL      Platelets 93 10*3/mm3            Imaging Results (Last 24 Hours)     Procedure Component Value Units Date/Time    CT Abdomen Pelvis With Contrast [[id number]] Collected:  03/17/20 2147     Updated:  03/17/20 2228    Narrative:       Exam: CT abdomen pelvis with contrast    INDICATION: Abdominal distention    TECHNIQUE: Routine CT abdomen pelvis without contrast. Sagittal  coronal reconstructions were obtained. This exam was performed  according to our departmental dose-optimization program, which  includes automated exposure control, adjustment of the mA and/or  kV according to patient size and/or use of iterative  reconstruction  technique.      Impression:       Nonspecific elevation of the right hemidiaphragm.  Mild atelectasis is present in the right lung base. There is a  small right pleural effusion.    The liver is small with a nodular contour compatible with  cirrhosis. There is a small stone in the gallbladder. The spleen  is not enlarged. Pancreas and adrenal glands are unremarkable.  There are bilateral renal cysts. No urinary tract calculus or  hydronephrosis.    Plaque is present in the abdominal aorta. No aneurysm. There is  massive ascites. No bowel obstruction or obvious abnormal bowel  wall thickening. Diverticular disease is present.    The bladder is unremarkable.    There are degenerative changes along the thoracolumbar spine    IMPRESSION:  1. Severe ascites. Recommend clinical correlation.  2. Findings compatible prior cirrhosis  3. Cholelithiasis  4. Diverticulosis  5. Mild right basilar atelectasis with small right pleural  effusion    Electronically signed by:  Jevon Olivera MD  3/17/2020 10:27 PM  CDT Workstation: [id number]    CT Head Without Contrast [[id number]] Collected:  03/17/20 2147     Updated:  03/17/20 2218    Narrative:       Exam is to without contrast    INDICATION: Confusion state    COMPARISON: 7/15/2015    FINDINGS: Routine head CT without contrast. Sagittal coronal  reconstructions were obtained. This exam was performed according  to our departmental dose-optimization program, which includes  automated exposure control, adjustment of the mA and/or kV  according to patient size and/or use of iterative reconstruction  technique.    FINDINGS: The bony calvarium is intact. The imaged paranasal  sinuses and mastoid air cells are clear. Plaque is present in the  intracranial arteries. No extra-axial fluid collection.  Enlargement of the ventricles and sulci compatible with  age-related atrophy. Gray-white differentiation is maintained.  Mild to moderate chronic ischemic change. No obvious sign of  acute  infarction. No intraparenchymal hemorrhage, mass or midline      Impression:       No obvious intracranial abnormality. Recommend  follow-up as clinically warranted.    Electronically signed by:  Jevon Olivera MD  3/17/2020 10:17 PM  CDT Workstation: [id number]    XR Chest 1 View [[id number]] Collected:  03/17/20 2055     Updated:  03/17/20 2118    Narrative:       Exam: AP portable chest    INDICATION: Dyspnea    COMPARISON: 7/15/2015    FINDINGS: AP portable chest. There are metallic fragments  projecting over the left shoulder joint. The the  cardiomediastinal silhouette is unremarkable. Nonspecific  elevation of the right hemidiaphragm. Mild right basilar  atelectasis and/or scarring. Left lung is clear.      Impression:       Nonspecific elevation of the right hemidiaphragm with  right basilar atelectasis.    Electronically signed by:  Jevon Olivera MD  3/17/2020 9:17 PM  CDT Workstation: [id number]            I reviewed the patient's new clinical results.    ASSESSMENT AND PLAN: This is a 75 y.o. male with:    Active Hospital Problems    Diagnosis POA   • **Decompensated liver disease (CMS/HCC) [K74.69] Yes     Priority: High     Patient with chronic liver disease related to hepatitis C.  Patient not had genotyping of serotyping for this or therapy.  Patient seen gastroenterology Dr. Rivera today.  She recommended diuresis for his severe ascites.  This was confirmed on CT abdomen today.  We will order paracentesis with body fluid analysis tomorrow.  We will also begin loop diuresis for peripheral edema and right-sided pleural effusion.  Patient's meld score is 12 points with less than 2% estimated 90-day mortality.  -Of note patient on aspirin clopidogrel for coronary artery disease we are holding clopidogrel  - Ultrasound by interventional radiology for paracentesis tomorrow  -Body fluid cell count amylase lipase and albumin to calculate SAAG score.  -Furosemide 40 mg p.o. daily  -Daily standing weights  -Strict  I's and O's  - Regular diet with 1.5 L fluid restriction  -Ammonia within normal limits     • Cachexia (CMS/HCC) [R64] Yes     Priority: Medium     Body mass index is 22.82 kg/m².  Despite this patient has prominence of clavicles and depressed sunken in skin in his acromioclavicular spaces.  -Nutrition consult for malnutrition severity index  -Patient with hypoalbuminemia likely related to chronic decompensated liver disease       • Acquired hypothyroidism [E03.9] Yes     Priority: Medium     Patient with TSH of 5 with chronic altered mental status with dementia.  We will increase patient's levothyroxine to remove metabolic process for this.  Patient does have any other signs of hypothyroidism and pretibial edema likely related to decompensated liver failure.  -Levothyroxine 75 mcg p.o. every morning     • Coronary artery disease involving native coronary artery of native heart without angina pectoris [I25.10] Yes     Priority: Low     We will continue most of his home meds holding dual antiplatelet therapy with paracentesis planned.  -Aspirin 81 mg p.o. every morning  -Holding clopidogrel 75 mg p.o. daily holding  - Ramipril 5 mg p.o. daily     • Vitamin D deficiency [E55.9] Yes     Resume home medications.  -Cholecalciferol 2000 units p.o. daily     • Vascular dementia without behavioral disturbance (CMS/HCC) [F01.50] Yes     Patient with altered mental status only acute alert and oriented x1.  Patient is currently conserved by guardianship from his wife for legal decisions.  We will continue home medication to help phyllis further memory loss.  -Memantine 5 mg p.o. twice daily     • Physical deconditioning [R53.81] Yes     Patient has been bedbound with multiple TIAs.  Bedbound status began in November 2019.  Patient with minimal ambulation to bathroom in or out of home.  Possible need for placement with wife being sole caregiver.  -PT and OT evaluate and treat  -Social work consult     • Sacral wound [S31.000A]  Yes     Patient with rough keratotic skin over sacral process.  This is in stage of healing.  This is not qualified stage I ulcer, however would benefit from barrier protection.  -Wound care twice daily     • Benign prostatic hyperplasia [N40.0] Yes     We will continue home alpha blockade.  -Tamsulosin 0.8 mg p.o. nightly     • Gastroesophageal reflux disease [K21.9] Yes     We will resume patient's home H2 blockade.  -Famotidine 40 mg p.o. daily     • Essential hypertension [I10] Yes     We will continue home medications and monitor blood pressure related to fluid shifts around paracentesis.  -Amlodipine 5 mg p.o. daily  - Furosemide 40 mg p.o. daily  -Ramipril 5 mg p.o. daily  -Vital signs per unit protocol         DVT prophylaxis: Holding enoxaparin with high Padua score for paracentesis     Jonnathan Albert and I have discussed pain goals for this hospitalization after reviewing his current clinical condition, medical history and prior pain experiences.  The goal is to keep the pain level less than 5/10.  To help achieve this, I plan to use IV ketorolac 15 mg every 6 hours for moderate to severe pain.    [facility] # [phone] , reviewed and consistent with patient reported medications.    Expected Length of Stay: Anticipated discharge in 24 to 60 hours    I discussed the patients findings and my recommendations with family.     Jayla Elizalde MD is the attending on record at time of admission, She is aware of the patient's status and agrees with the above history and physical.            This document has been electronically signed by Bruce Juline III, MD on March 18, 2020 02:54      Dragon disclaimer: Parts of this note were transcribed using dragon dictation software.

## 2020-03-18 NOTE — PLAN OF CARE
Problem: Patient Care Overview  Goal: Plan of Care Review  Outcome: Ongoing (interventions implemented as appropriate)  Flowsheets  Taken 3/18/2020 0355  Progress: no change  Outcome Summary: No new complaints at this time; wife at bedside; will continue to monitor.  Taken 3/18/2020 0332  Plan of Care Reviewed With: patient;spouse

## 2020-03-18 NOTE — PLAN OF CARE
Problem: Patient Care Overview  Goal: Plan of Care Review  Flowsheets (Taken 3/18/2020 1756)  Plan of Care Reviewed With: patient; spouse  Outcome Summary: PT evaluation completed. Pt rolled with min assistance and transferred supine to sit to supine with moderate assistance. Pt sat EOB 5 mnutes with SBA for sitting balance. Pt declined standing/walking due to fatigue following paracentesis earlier. Function limited by decreased strength, balance and tolerance for functional mobility and actvities. Pt will benefit from PT to regain lost function. Anticipate 24/7 care at discharge with continued therapy services.

## 2020-03-18 NOTE — ED PROVIDER NOTES
Subjective   75-year-old -American male with history of hypertension, hepatitis C, dementia presents to the emergency department with chief complaint of abdominal pain.  His wife relates he has urinary and bowel incontinence for 6 weeks.  He has worsening abdominal distention for 2 weeks. Patient was seen by gastroenterologist Dr. Rivera today and diagnosed with cirrhosis.          Review of Systems   Constitutional: Negative for chills, diaphoresis and fever.   Respiratory: Positive for shortness of breath. Negative for cough.    Cardiovascular: Negative for chest pain.   Gastrointestinal: Positive for abdominal pain and diarrhea. Negative for blood in stool, nausea and vomiting.   Genitourinary: Negative for dysuria.   Musculoskeletal: Negative for back pain and neck pain.   Neurological: Positive for weakness. Negative for syncope and headaches.   Psychiatric/Behavioral: Positive for confusion.   All other systems reviewed and are negative.      Past Medical History:   Diagnosis Date   • Anxiety state    • Chronic hepatitis C (CMS/HCC)    • Dementia (CMS/HCC)    • Depressive disorder    • Encounter for screening for malignant neoplasm of prostate    • Epigastric pain    • Essential hypertension    • Heartburn    • Impacted cerumen    • Prostatitis    • Special screening for malignant neoplasm of colon    • Urinary tract infectious disease    • Vascular insufficiency        No Known Allergies    Past Surgical History:   Procedure Laterality Date   • VASCULAR SURGERY         Family History   Problem Relation Age of Onset   • Dementia Other    • Diabetes Other    • Heart disease Other    • Hypertension Other    • Alcohol abuse Other    • Lung cancer Other    • Rheum arthritis Other    • Stroke Other        Social History     Socioeconomic History   • Marital status:      Spouse name: Not on file   • Number of children: Not on file   • Years of education: Not on file   • Highest education level: Not on  file   Tobacco Use   • Smoking status: Former Smoker   • Smokeless tobacco: Never Used   Substance and Sexual Activity   • Alcohol use: Not Currently   • Drug use: Not Currently     Types: Heroin   • Sexual activity: Defer           Objective   Physical Exam   Constitutional: He is oriented to person, place, and time. No distress.   HENT:   Head: Normocephalic and atraumatic.   Right Ear: External ear normal.   Left Ear: External ear normal.   Nose: Nose normal.   Mouth/Throat: Oropharynx is clear and moist.   Eyes: Pupils are equal, round, and reactive to light. Conjunctivae and EOM are normal.   Neck: Normal range of motion. Neck supple.   Cardiovascular: Normal rate, regular rhythm, normal heart sounds and intact distal pulses.   Pulmonary/Chest: Effort normal and breath sounds normal.   Abdominal: Bowel sounds are normal. He exhibits distension. He exhibits no mass. There is no tenderness. There is no rigidity, no rebound and no guarding.   Musculoskeletal: Normal range of motion. He exhibits no edema or tenderness.   Neurological: He is alert and oriented to person, place, and time. He exhibits normal muscle tone.   Skin: Skin is warm and dry. He is not diaphoretic.   Nursing note and vitals reviewed.      ECG 12 Lead    Date/Time: 3/17/2020 8:22 PM  Performed by: Franklyn Russ MD  Authorized by: Franklyn Russ MD   Interpreted by physician  Rhythm: sinus rhythm  Ectopy: PVCs  Rate: normal  BPM: 92  QRS axis: normal  Conduction: conduction normal  ST Segments: ST segments normal  T Waves: T waves normal  Clinical impression: non-specific ECG                 ED Course  ED Course as of Mar 17 2241   Tue Mar 17, 2020   2234 Patient is alert and resting comfortably in no acute distress.  I reviewed the results of his evaluation with him and his wife and recommended admission for observation and paracentesis in the morning.  I paged the family medicine resident.    [DR]   2235 Case discussed with Dr. Julien and he  agrees to admit to the medical floor.    [DR]      ED Course User Index  [DR] Franklyn Russ MD      Labs Reviewed   COMPREHENSIVE METABOLIC PANEL - Abnormal; Notable for the following components:       Result Value    Chloride 108 (*)     CO2 18.0 (*)     Calcium 8.0 (*)     Albumin 2.60 (*)     All other components within normal limits    Narrative:     GFR Normal >60  Chronic Kidney Disease <60  Kidney Failure <15     PROTIME-INR - Abnormal; Notable for the following components:    Protime 16.7 (*)     INR 1.37 (*)     All other components within normal limits    Narrative:     Therapeutic range for most indications is 2.0-3.0 INR,  or 2.5-3.5 for mechanical heart valves.   URINALYSIS W/ MICROSCOPIC IF INDICATED (NO CULTURE) - Abnormal; Notable for the following components:    Bilirubin, UA Small (1+) (*)     Protein, UA 30 mg/dL (1+) (*)     Urobilinogen, UA 2.0 E.U./dL (*)     All other components within normal limits   AMMONIA - Abnormal; Notable for the following components:    Ammonia 14 (*)     All other components within normal limits   TSH - Abnormal; Notable for the following components:    TSH 5.070 (*)     All other components within normal limits   CBC WITH AUTO DIFFERENTIAL - Abnormal; Notable for the following components:    WBC 1.93 (*)     RBC 3.04 (*)     Hemoglobin 9.8 (*)     Hematocrit 27.9 (*)     RDW 15.8 (*)     Platelets 93 (*)     All other components within normal limits   MANUAL DIFFERENTIAL - Abnormal; Notable for the following components:    Neutrophils Absolute 1.00 (*)     Lymphocytes Absolute 0.66 (*)     All other components within normal limits   URINALYSIS, MICROSCOPIC ONLY - Abnormal; Notable for the following components:    RBC, UA 0-2 (*)     All other components within normal limits   APTT - Normal    Narrative:     The recommended Heparin therapeutic range is 68-97 seconds.   LIPASE - Normal   TROPONIN (IN-HOUSE) - Normal    Narrative:     Troponin T Reference Range:  <=  0.03 ng/mL-   Negative for AMI  >0.03 ng/mL-     Abnormal for myocardial necrosis.  Clinicians would have to utilize clinical acumen, EKG, Troponin and serial changes to determine if it is an Acute Myocardial Infarction or myocardial injury due to an underlying chronic condition.       Results may be falsely decreased if patient taking Biotin.     CBC AND DIFFERENTIAL    Narrative:     The following orders were created for panel order CBC & Differential.  Procedure                               Abnormality         Status                     ---------                               -----------         ------                     CBC Auto Differential[[id number]]        Abnormal            Final result                 Please view results for these tests on the individual orders.   EXTRA TUBES    Narrative:     The following orders were created for panel order Extra Tubes.  Procedure                               Abnormality         Status                     ---------                               -----------         ------                     Lavender Top[[id number]]                                     Final result               Gold Top - SST[[id number]]                                   Final result                 Please view results for these tests on the individual orders.   LAVENDER TOP   GOLD TOP - SST     Ct Head Without Contrast    Result Date: 3/17/2020  Narrative: Exam is to without contrast INDICATION: Confusion state COMPARISON: 7/15/2015 FINDINGS: Routine head CT without contrast. Sagittal coronal reconstructions were obtained. This exam was performed according to our departmental dose-optimization program, which includes automated exposure control, adjustment of the mA and/or kV according to patient size and/or use of iterative reconstruction technique. FINDINGS: The bony calvarium is intact. The imaged paranasal sinuses and mastoid air cells are clear. Plaque is present in the intracranial arteries. No  extra-axial fluid collection. Enlargement of the ventricles and sulci compatible with age-related atrophy. Gray-white differentiation is maintained. Mild to moderate chronic ischemic change. No obvious sign of acute infarction. No intraparenchymal hemorrhage, mass or midline     Impression: No obvious intracranial abnormality. Recommend follow-up as clinically warranted. Electronically signed by:  Jevon Olivera MD  3/17/2020 10:17 PM CDT Workstation: [id number]    Ct Abdomen Pelvis With Contrast    Result Date: 3/17/2020  Narrative: Exam: CT abdomen pelvis with contrast INDICATION: Abdominal distention TECHNIQUE: Routine CT abdomen pelvis without contrast. Sagittal coronal reconstructions were obtained. This exam was performed according to our departmental dose-optimization program, which includes automated exposure control, adjustment of the mA and/or kV according to patient size and/or use of iterative reconstruction technique.     Impression: Nonspecific elevation of the right hemidiaphragm. Mild atelectasis is present in the right lung base. There is a small right pleural effusion. The liver is small with a nodular contour compatible with cirrhosis. There is a small stone in the gallbladder. The spleen is not enlarged. Pancreas and adrenal glands are unremarkable. There are bilateral renal cysts. No urinary tract calculus or hydronephrosis. Plaque is present in the abdominal aorta. No aneurysm. There is massive ascites. No bowel obstruction or obvious abnormal bowel wall thickening. Diverticular disease is present. The bladder is unremarkable. There are degenerative changes along the thoracolumbar spine IMPRESSION: 1. Severe ascites. Recommend clinical correlation. 2. Findings compatible prior cirrhosis 3. Cholelithiasis 4. Diverticulosis 5. Mild right basilar atelectasis with small right pleural effusion Electronically signed by:  Jevon Olivera MD  3/17/2020 10:27 PM CDT Workstation: [id number]    Xr Chest 1  View    Result Date: 3/17/2020  Narrative: Exam: AP portable chest INDICATION: Dyspnea COMPARISON: 7/15/2015 FINDINGS: AP portable chest. There are metallic fragments projecting over the left shoulder joint. The the cardiomediastinal silhouette is unremarkable. Nonspecific elevation of the right hemidiaphragm. Mild right basilar atelectasis and/or scarring. Left lung is clear.     Impression: Nonspecific elevation of the right hemidiaphragm with right basilar atelectasis. Electronically signed by:  Jevon Olivera MD  3/17/2020 9:17 PM CDT Workstation: [id number]                                       [facility]    Final diagnoses:   Poorly controlled ascites            Franklyn Russ MD  03/17/20 2466

## 2020-03-18 NOTE — PRE-PROCEDURE NOTE
Patient for US directed paracentesis. Procedure, risks , and benefits discussed with patient and patient  gave informed consent.  H&P dated 3/18/2020 reviewed with no changes.

## 2020-03-18 NOTE — PROGRESS NOTES
"Malnutrition Severity Assessment    Patient Name:  Jonnathan Albert  YOB: 1945  MRN: [id number]  Admit Date:  3/17/2020    Patient meets criteria for : Severe Malnutrition    Comments:  Family reports that pt eats a good breakfast and \"not much else\" thru the day. She states he is usually very picky but he is eating different things in hospital than he normally would (possibly d/t dementia).  She reports wt in 2011 was 230# and down to 185# \"after stroke\" and has been decreasing ever since slowly down to 130-135#- wt is up with \"fluid in his belly\". Current weight 145#BMI 22.0. Nutrition Focused Physical Exam notes severe fat loss and muscle wasting. Pt meets criteria for severe, chronic malnutrition. Pt on reg diet w/ 1500ml fluid restriction. RD added cardiac (salt) restriction and magic cup L/D and whole milk as beverage at meals. Pt likes candy bars and these will be sent w/ L/D to encourage \"snacking\". Diet ed completed re: increased protein and calories into diet. Family states h/o Megace but did not seem effective. MD may need consider another appetite stimulant. RD to follow hospital course.      Malnutrition Severity Assessment  Malnutrition Type: Chronic Disease - Related Malnutrition     Malnutrition Type (last 8 hours)      Malnutrition Severity Assessment     Row Name 03/18/20 1404       Malnutrition Severity Assessment    Malnutrition Type  Chronic Disease - Related Malnutrition    Row Name 03/18/20 1404       Insufficient Energy Intake     Insufficient Energy Intake   <50% of est. energy requirement for >or equal to 1 month and longer    Row Name 03/18/20 1404       Unintentional Weight Loss     Unintentional Weight Loss   -- steady loss from 230# to 130s over several years    Row Name 03/18/20 1404       Muscle Loss    Loss of Muscle Mass Findings  Severe    [place] Region  Severe - deep hollowing/scooping, lack of muscle to touch, facial bones well defined    Clavicle Bone Region  " Severe - protruding prominent bone    Acromion Bone Region  Severe - squared shoulders, bones, and acromion process protrusion prominent    Scapular Bone Region  Severe - prominent bones, depressions easily visible between ribs, scapula, spine, shoulders    Dorsal Hand Region  Severe - prominent depression    Patellar Region  Moderate - patella more prominent, less muscle definition around patella    Anterior Thigh Region  Moderate - mild depression on inner thigh    Posterior Calf Region  Severe - thin with very little definition/firmness    Row Name 03/18/20 1404       Fat Loss    Subcutaneous Fat Loss Findings  Severe    Orbital Region   Severe - pronounced hollowness/depression, dark circles, loose saggy skin    Upper Arm Region  Severe - mostly skin, very little space between folds, fingers touch    Thoracic & Lumbar Region  -- unable to assess d/t LOTS of ascites    Row Name 03/18/20 1404       Fluid Accumulation (Edema)    Fluid Accumulation   -- lots of ascites    Row Name 03/18/20 1404       Criteria Met (Must meet criteria for severity in at least 2 of these categories: M Wasting, Fat Loss, Fluid, Secondary Signs, Wt. Status, Intake)    Patient meets criteria for   Severe Malnutrition          Electronically signed by:  Maria R Armendariz RD  03/18/20 14:27

## 2020-03-18 NOTE — ACP (ADVANCE CARE PLANNING)
Saw patient and spouse regarding ACP consult.  Completing a living will is not indicated as patient has a guardian.  His spouse is his guardian and she provided a copy of the order of appointment of guardian which includes making medical decisions.  Copy of guardian order sent to HIM to scan into patient's record.

## 2020-03-18 NOTE — PROGRESS NOTES
FAMILY MEDICINE DAILY PROGRESS NOTE    NAME: Jonnathan Albert  : 1945  MRN: [id number]      LOS: 0 days     PROVIDER OF SERVICE: Steffany Child MD    Chief Complaint: Cirrhosis of liver with ascites (CMS/HCC)    Subjective:     Interval History:      He is sleeping this morning. He woke up to answer a few questions but is very tired.   He does not have any complaints today.    Review of Systems:   Review of Systems   Constitutional: Negative for chills, diaphoresis and fever.   HENT: Negative for sneezing and sore throat.    Eyes: Negative for pain and discharge.   Respiratory: Negative for cough and shortness of breath.    Gastrointestinal: Negative for constipation, diarrhea, nausea and vomiting.   Endocrine: Negative for cold intolerance and heat intolerance.   Genitourinary: Negative for difficulty urinating, dysuria, frequency and urgency.   Musculoskeletal: Negative for arthralgias and myalgias.   Skin: Negative for color change and pallor.   Allergic/Immunologic: Negative for environmental allergies and food allergies.   Neurological: Negative for dizziness, syncope and weakness.   Psychiatric/Behavioral: Negative for confusion and sleep disturbance.       Objective:     Vital Signs  Temp:  [96.3 °F (35.7 °C)-97.9 °F (36.6 °C)] 96.3 °F (35.7 °C)  Heart Rate:  [] 76  Resp:  [18-20] 18  BP: (111-179)/() 122/80  Body mass index is 22.05 kg/m².    Physical Exam  Physical Exam   Constitutional: He is oriented to person, place, and time. He appears well-developed and well-nourished. No distress.   HENT:   Head: Normocephalic and atraumatic.   Nose: Nose normal.   Eyes: Conjunctivae and EOM are normal.   Neck: Normal range of motion. Neck supple.   Cardiovascular: Normal rate, regular rhythm and normal heart sounds.   No murmur heard.  Pulmonary/Chest: Effort normal and breath sounds normal. No respiratory distress. He has no wheezes. He has no rales.   Abdominal: Soft. Bowel sounds  are normal. He exhibits no distension. There is no tenderness. There is no guarding.   Musculoskeletal: Normal range of motion. He exhibits edema.   Neurological: He is alert and oriented to person, place, and time.   Skin: Skin is warm and dry.   Psychiatric: He has a normal mood and affect. His behavior is normal.   Vitals reviewed.      Medication Review    Current Facility-Administered Medications:   •  amLODIPine (NORVASC) tablet 5 mg, 5 mg, Oral, Daily, Bruce Julien III, MD, 5 mg at 03/18/20 1058  •  aspirin chewable tablet 81 mg, 81 mg, Oral, Daily, Bruce Julien III, MD, 81 mg at 03/18/20 1058  •  cholecalciferol (VITAMIN D3) tablet 2,000 Units, 2,000 Units, Oral, Daily, Bruce Julien III, MD, 2,000 Units at 03/18/20 1058  •  famotidine (PEPCID) tablet 40 mg, 40 mg, Oral, Daily, Bruce Julien III, MD, 40 mg at 03/18/20 1057  •  furosemide (LASIX) tablet 40 mg, 40 mg, Oral, Daily, Bruce Julien III, MD, 40 mg at 03/18/20 1058  •  ketorolac (TORADOL) injection 15 mg, 15 mg, Intravenous, Q6H PRN, Bruce Julien III, MD  •  [START ON 3/19/2020] levothyroxine (SYNTHROID, LEVOTHROID) tablet 50 mcg, 50 mcg, Oral, Q AM, Steffany Child MD  •  memantine (NAMENDA) tablet 5 mg, 5 mg, Oral, BID, Bruce Julien III, MD, 5 mg at 03/18/20 1057  •  ondansetron (ZOFRAN) tablet 4 mg, 4 mg, Oral, Q6H PRN **OR** ondansetron (ZOFRAN) injection 4 mg, 4 mg, Intravenous, Q6H PRN, Bruce Julien III, MD  •  ramipril (ALTACE) capsule 5 mg, 5 mg, Oral, Daily, Bruce Julien III, MD, 5 mg at 03/18/20 1058  •  [COMPLETED] Insert peripheral IV, , , Once **AND** sodium chloride 0.9 % flush 10 mL, 10 mL, Intravenous, PRN, Bruce Julien III, MD  •  sodium chloride 0.9 % flush 10 mL, 10 mL, Intravenous, Q12H, Bruce Julien III, MD, 10 mL at 03/18/20 1059  •  sodium chloride 0.9 % flush 10 mL, 10 mL, Intravenous, PRN, Bruce Julien III, MD  •  tamsulosin (FLOMAX) 24 hr capsule 0.8  mg, 0.8 mg, Oral, Nightly, Bruce Julien III, MD     Diagnostic Data    Lab Results (last 24 hours)     Procedure Component Value Units Date/Time    Body Fluid Cell Count With Differential - Body Fluid, Peritoneum [[id number]] Collected:  03/18/20 1048    Specimen:  Body Fluid from Peritoneum Updated:  03/18/20 1246    Narrative:       The following orders were created for panel order Body Fluid Cell Count With Differential - Body Fluid, Peritoneum.  Procedure                               Abnormality         Status                     ---------                               -----------         ------                     Body fluid cell count - ...[[id number]]  Abnormal            Final result               Body fluid differential ...[[id number]]                      Final result                 Please view results for these tests on the individual orders.    Body fluid differential - Body Fluid, Peritoneum [[id number]] Collected:  03/18/20 1048    Specimen:  Body Fluid from Peritoneum Updated:  03/18/20 1246     Neutrophils, Fluid 11 %      Mononuclear, Fluid 89 %     Narrative:       Reference intervals are unavailable for this body fluid differential. Comparison of the result with concentration in the blood, serum or plasma is recommended.    Body fluid cell count - Body Fluid, Peritoneum [[id number]]  (Abnormal) Collected:  03/18/20 1048    Specimen:  Body Fluid from Peritoneum Updated:  03/18/20 1234     WBC, Fluid 122.5 /mm3      RBC, Fluid 293 /mm3      Color, Fluid Yellow     Appearance, Fluid Slightly Cloudy     Volume, Fluid 8,400.0 mL     Body Fluid Culture - Body Fluid, Peritoneum [[id number]] Collected:  03/18/20 1048    Specimen:  Body Fluid from Peritoneum Updated:  03/18/20 1220     Gram Stain Rare (1+) WBCs seen      No organisms seen    CBC & Differential [[id number]] Collected:  03/18/20 1104    Specimen:  Blood Updated:  03/18/20 1119    Narrative:       The following orders were created for panel  order CBC & Differential.  Procedure                               Abnormality         Status                     ---------                               -----------         ------                     CBC Auto Differential[[id number]]        Abnormal            Final result                 Please view results for these tests on the individual orders.    CBC Auto Differential [[id number]]  (Abnormal) Collected:  03/18/20 1104    Specimen:  Blood Updated:  03/18/20 1119     WBC 1.75 10*3/mm3      RBC 2.81 10*6/mm3      Hemoglobin 9.3 g/dL      Hematocrit 25.6 %      MCV 91.1 fL      MCH 33.1 pg      MCHC 36.3 g/dL      RDW 15.7 %      RDW-SD 52.5 fl      MPV 11.3 fL      Platelets 86 10*3/mm3      Neutrophil % 45.6 %      Lymphocyte % 38.9 %      Monocyte % 10.9 %      Eosinophil % 4.0 %      Basophil % 0.0 %      Immature Grans % 0.6 %      Neutrophils, Absolute 0.80 10*3/mm3      Lymphocytes, Absolute 0.68 10*3/mm3      Monocytes, Absolute 0.19 10*3/mm3      Eosinophils, Absolute 0.07 10*3/mm3      Basophils, Absolute 0.00 10*3/mm3      Immature Grans, Absolute 0.01 10*3/mm3      nRBC 0.0 /100 WBC     Albumin, Fluid - Body Fluid, Peritoneum [[id number]] Collected:  03/18/20 1048    Specimen:  Body Fluid from Peritoneum Updated:  03/18/20 1048    Amylase, Body Fluid - Body Fluid, Peritoneum [[id number]] Collected:  03/18/20 1048    Specimen:  Body Fluid from Peritoneum Updated:  03/18/20 1048    Lactate Dehydrogenase, Body Fluid - Body Fluid, Peritoneum [[id number]] Collected:  03/18/20 1048    Specimen:  Body Fluid from Peritoneum Updated:  03/18/20 1048    Anaerobic Culture - Body Fluid, Peritoneum [[id number]] Collected:  03/18/20 1048    Specimen:  Body Fluid from Peritoneum Updated:  03/18/20 1048    Urinalysis With Microscopic If Indicated (No Culture) - Urine, Clean Catch [[id number]]  (Abnormal) Collected:  03/17/20 2107    Specimen:  Urine, Clean Catch Updated:  03/17/20 2132     Color, UA Dark Yellow      Appearance, UA Clear     pH, UA 5.5     Specific Gravity, UA 1.023     Glucose, UA Negative     Ketones, UA Negative     Bilirubin, UA Small (1+)     Blood, UA Negative     Protein, UA 30 mg/dL (1+)     Leuk Esterase, UA Negative     Nitrite, UA Negative     Urobilinogen, UA 2.0 E.U./dL    Urinalysis, Microscopic Only - Urine, Clean Catch [[id number]]  (Abnormal) Collected:  03/17/20 2107    Specimen:  Urine, Clean Catch Updated:  03/17/20 2132     RBC, UA 0-2 /HPF      WBC, UA 0-2 /HPF      Bacteria, UA None Seen /HPF      Squamous Epithelial Cells, UA None Seen /HPF      Hyaline Casts, UA 7-12 /LPF      Methodology Automated Microscopy    Manual Differential [[id number]]  (Abnormal) Collected:  03/17/20 2023    Specimen:  Blood Updated:  03/17/20 2131     Neutrophil % 52.0 %      Lymphocyte % 34.0 %      Monocyte % 12.0 %      Eosinophil % 2.0 %      Neutrophils Absolute 1.00 10*3/mm3      Lymphocytes Absolute 0.66 10*3/mm3      Monocytes Absolute 0.23 10*3/mm3      Eosinophils Absolute 0.04 10*3/mm3      Anisocytosis Slight/1+     Hypochromia Slight/1+     Target Cells Slight/1+     WBC Morphology Normal     Platelet Estimate Decreased    Extra Tubes [[id number]] Collected:  03/17/20 2023    Specimen:  Blood, Venous Line Updated:  03/17/20 2131    Narrative:       The following orders were created for panel order Extra Tubes.  Procedure                               Abnormality         Status                     ---------                               -----------         ------                     Lavender Top[[id number]]                                     Final result               Gold Top - SST[[id number]]                                   Final result                 Please view results for these tests on the individual orders.    Lavender Top [[id number]] Collected:  03/17/20 2023    Specimen:  Blood Updated:  03/17/20 2131     Extra Tube hold for add-on     Comment: Auto resulted       Gold Top - SST [[id number]]  Collected:  03/17/20 2023    Specimen:  Blood Updated:  03/17/20 2131     Extra Tube Hold for add-ons.     Comment: Auto resulted.       Troponin [[id number]]  (Normal) Collected:  03/17/20 2023    Specimen:  Blood Updated:  03/17/20 2059     Troponin T 0.013 ng/mL     Narrative:       Troponin T Reference Range:  <= 0.03 ng/mL-   Negative for AMI  >0.03 ng/mL-     Abnormal for myocardial necrosis.  Clinicians would have to utilize clinical acumen, EKG, Troponin and serial changes to determine if it is an Acute Myocardial Infarction or myocardial injury due to an underlying chronic condition.       Results may be falsely decreased if patient taking Biotin.      TSH [[id number]]  (Abnormal) Collected:  03/17/20 2023    Specimen:  Blood Updated:  03/17/20 2059     TSH 5.070 uIU/mL     aPTT [[id number]]  (Normal) Collected:  03/17/20 2023    Specimen:  Blood Updated:  03/17/20 2057     PTT 28.2 seconds     Narrative:       The recommended Heparin therapeutic range is 68-97 seconds.    Protime-INR [[id number]]  (Abnormal) Collected:  03/17/20 2023    Specimen:  Blood Updated:  03/17/20 2057     Protime 16.7 Seconds      INR 1.37    Narrative:       Therapeutic range for most indications is 2.0-3.0 INR,  or 2.5-3.5 for mechanical heart valves.    Comprehensive Metabolic Panel [[id number]]  (Abnormal) Collected:  03/17/20 2023    Specimen:  Blood Updated:  03/17/20 2055     Glucose 89 mg/dL      BUN 19 mg/dL      Creatinine 1.02 mg/dL      Sodium 137 mmol/L      Potassium 4.1 mmol/L      Chloride 108 mmol/L      CO2 18.0 mmol/L      Calcium 8.0 mg/dL      Total Protein 7.3 g/dL      Albumin 2.60 g/dL      ALT (SGPT) 9 U/L      AST (SGOT) 27 U/L      Alkaline Phosphatase 42 U/L      Total Bilirubin 0.5 mg/dL      eGFR   Amer 86 mL/min/1.73      Globulin 4.7 gm/dL      A/G Ratio 0.6 g/dL      BUN/Creatinine Ratio 18.6     Anion Gap 11.0 mmol/L     Narrative:       GFR Normal >60  Chronic Kidney Disease <60  Kidney Failure  <15      Lipase [[id number]]  (Normal) Collected:  03/17/20 2023    Specimen:  Blood Updated:  03/17/20 2054     Lipase 18 U/L     Ammonia [[id number]]  (Abnormal) Collected:  03/17/20 2023    Specimen:  Blood Updated:  03/17/20 2053     Ammonia 14 umol/L     CBC & Differential [[id number]] Collected:  03/17/20 2023    Specimen:  Blood Updated:  03/17/20 2034    Narrative:       The following orders were created for panel order CBC & Differential.  Procedure                               Abnormality         Status                     ---------                               -----------         ------                     CBC Auto Differential[[id number]]        Abnormal            Final result                 Please view results for these tests on the individual orders.    CBC Auto Differential [[id number]]  (Abnormal) Collected:  03/17/20 2023    Specimen:  Blood Updated:  03/17/20 2034     WBC 1.93 10*3/mm3      RBC 3.04 10*6/mm3      Hemoglobin 9.8 g/dL      Hematocrit 27.9 %      MCV 91.8 fL      MCH 32.2 pg      MCHC 35.1 g/dL      RDW 15.8 %      RDW-SD 53.0 fl      MPV 10.9 fL      Platelets 93 10*3/mm3             I reviewed the patient's new clinical results.    Assessment/Plan:     Active Hospital Problems    Diagnosis POA   • **Cirrhosis of liver with ascites (CMS/HCC) [K74.60, R18.8] Yes     Patient with chronic liver disease related to hepatitis C.     Patient seen gastroenterology Dr. Rivera today.    - Ultrasound by interventional radiology for paracentesis today  -Body fluid cell count amylase lipase and albumin to calculate SAAG score.  -Furosemide 40 mg p.o. daily  -Daily standing weights  -Strict I's and O's  - Regular diet with 1.5 L fluid restriction  -Ammonia within normal limits     • Coronary artery disease involving native coronary artery of native heart without angina pectoris [I25.10] Yes     We will continue most of his home meds holding dual antiplatelet therapy with paracentesis  planned.  -Aspirin 81 mg p.o. every morning  -Holding clopidogrel 75 mg p.o. daily holding  - Ramipril 5 mg p.o. daily     • Vitamin D deficiency [E55.9] Yes     Resume home medications.  -Cholecalciferol 2000 units p.o. daily     • Vascular dementia without behavioral disturbance (CMS/HCC) [F01.50] Yes     Patient with altered mental status only acute alert and oriented x1.  Patient is currently conserved by guardianship from his wife for legal decisions.  We will continue home medication to help phyllis further memory loss.  -Memantine 5 mg p.o. twice daily     • Cachexia (CMS/HCC) [R64] Yes     Body mass index is 22.82 kg/m².  Despite this patient has prominence of clavicles and depressed sunken in skin in his acromioclavicular spaces.  -Nutrition consult for malnutrition severity index  -Patient with hypoalbuminemia likely related to chronic decompensated liver disease       • Physical deconditioning [R53.81] Yes     Patient has been bedbound with multiple TIAs.  Bedbound status began in November 2019.  Patient with minimal ambulation to bathroom in or out of home.  Possible need for placement with wife being sole caregiver.  -PT and OT evaluate and treat  -Social work consult     • Sacral wound [S31.000A] Yes     Patient with rough keratotic skin over sacral process.  This is in stage of healing.  This is not qualified stage I ulcer, however would benefit from barrier protection.  -Wound care twice daily     • Chronic hepatitis C with cirrhosis (CMS/HCC) [B18.2, K74.60] Yes   • Chronic hepatitis C virus genotype 1a infection (CMS/HCC) [B18.2] Yes   • Acquired hypothyroidism [E03.9] Yes     -Levothyroxine 50 mcg p.o. every morning     • Benign prostatic hyperplasia [N40.0] Yes     We will continue home alpha blockade.  -Tamsulosin 0.8 mg p.o. nightly     • Gastroesophageal reflux disease [K21.9] Yes     We will resume patient's home H2 blockade.  -Famotidine 40 mg p.o. daily     • Essential hypertension [I10] Yes      We will continue home medications and monitor blood pressure related to fluid shifts around paracentesis.  -Amlodipine 5 mg p.o. daily  - Furosemide 40 mg p.o. daily  -Ramipril 5 mg p.o. daily  -Vital signs per unit protocol         DVT prophylaxis: SCDs/TEDs  Code status is   Code Status and Medical Interventions:   Ordered at: 03/18/20 0031     Level Of Support Discussed With:    Health Care Surrogate    Next of Kin (If No Surrogate)     Code Status:    CPR     Medical Interventions (Level of Support Prior to Arrest):    Full     Comments:    Pt is conserved by wife       Plan for disposition:home in 1-3 days      Time: >30 min

## 2020-03-18 NOTE — PLAN OF CARE
Problem: Patient Care Overview  Goal: Plan of Care Review  Outcome: Ongoing (interventions implemented as appropriate)  Flowsheets (Taken 3/18/2020 1440)  Progress: no change  Plan of Care Reviewed With: patient  Outcome Summary: Patient had paracentesis today with a little over 6L pulled off. One time dose of albumin ordered. Vitals are stable. Will continue to monitor.

## 2020-03-18 NOTE — CONSULTS
"Adult Nutrition  Assessment    Patient Name:  Jonnathan Albert  YOB: 1945  MRN: [id number]  Admit Date:  3/17/2020    Assessment Date:  3/18/2020    Comments:  74yo male admit with ascites, is pending paracentesis. PMH of Hep C/liver disease, dementia, TIAs. Family reports that pt eats a good breakfast and \"not much else\" thru the day. She states he is usually very picky but he is eating different things in hospital than he normally would (possibly d/t dementia).  She reports wt in 2011 was 230# and down to 185# \"after stroke\" and has been decreasing ever since slowly down to 130-135#- wt is up with \"fluid in his belly\". Current weight 145#BMI 22.0. Pt meets criteria for severe, chronic malnutrition.  Pt on reg diet w/ 1500ml fluid restriction. RD added cardiac (salt) restriction and magic cup L/D and whole milk as beverage at meals. Pt likes candy bars and these will be sent w/ L/D to encourage \"snacking\". Diet ed completed re: increased protein and calories into diet. Family states h/o Megace but did not seem effective. MD may need consider another appetite stimulant. RD to follow hospital course.    Reason for Assessment     Row Name 03/18/20 1359          Reason for Assessment    Reason For Assessment  physician consult     Diagnosis  liver disease     Identified At Risk by Screening Criteria  -- MSA         Nutrition/Diet History     Row Name 03/18/20 1355          Nutrition/Diet History    Typical Food/Fluid Intake  Pt very [place] and didnt answer too many questions. Family at bedside states nkfa, no c/s problems. Report that pt eats a good breakfast and \"not much else\" thru the day. She states he is usually very picky but he is eating different things in hospital than he normally would.  She rpeorts wt 2011 230# and down to 185# after stroke and has been decreasing ever since slowly down to 130-135#- wt is up with \"fluid in his belly\". Diet ed completed re: increased protein and calories into " diet.     Food Preferences  noted taste changes, ? d/t dementia. Asked for butterfinger/pay day/baby elvin---like smilk and ice cream     Supplemental Drinks/Foods/Additives  does not drink boost/ensure well- he thinks he doenst need it     Factors Affecting Nutritional Intake  taste altered           Labs/Tests/Procedures/Meds     Row Name 03/18/20 1402          Labs/Procedures/Meds    Lab Results Reviewed  reviewed     Lab Results Comments  Gku 89, ALb 2.6L        Diagnostic Tests/Procedures    Diagnostic Test/Procedure Reviewed  reviewed     Diagnostic Test/Procedures Comments  CT head, abdomen/pelvis, CXR        Medications    Pertinent Medications Reviewed  reviewed     Pertinent Medications Comments  Vit D, lasix 40Qd           Estimated/Assessed Needs     Row Name 03/18/20 1403          Calculation Measurements    Weight Used For Calculations  65.8 kg (145 lb)        Estimated/Assessed Needs    Additional Documentation  Fluid Requirements (Group);Protein Requirements (Group);Calorie Requirements (Group);KCAL/KG (Group)        Calorie Requirements    Estimated Calorie Requirement (kcal/day)  1800        KCAL/KG    KCAL/KG  25 Kcal/Kg (kcal);30 Kcal/Kg (kcal)     25 Kcal/Kg (kcal)  1644.3     30 Kcal/Kg (kcal)  1973.16        Protein Requirements    Weight Used For Protein Calculations  65.8 kg (145 lb)     Est Protein Requirement Amount (gms/kg)  1.2 gm protein     Estimated Protein Requirements (gms/day)  78.93        Fluid Requirements    Estimated Fluid Requirements (mL/day)  1500     RDA Method (mL)  1500     [place]-Segar Method (over 20 kg)  2815.44         Nutrition Prescription Ordered     Row Name 03/18/20 1404          Nutrition Prescription PO    Current PO Diet  Regular     Common Modifiers  Fluid Restriction     Fluid Restriction mL per Day  1500 mL             Malnutrition Severity Assessment     Row Name 03/18/20 1404          Malnutrition Severity Assessment    Malnutrition Type  Chronic  Disease - Related Malnutrition        Insufficient Energy Intake     Insufficient Energy Intake   <50% of est. energy requirement for >or equal to 1 month and longer        Unintentional Weight Loss     Unintentional Weight Loss   -- steady loss from 230# to 130s over several years        Muscle Loss    Loss of Muscle Mass Findings  Severe     [place] Region  Severe - deep hollowing/scooping, lack of muscle to touch, facial bones well defined     Clavicle Bone Region  Severe - protruding prominent bone     Acromion Bone Region  Severe - squared shoulders, bones, and acromion process protrusion prominent     Scapular Bone Region  Severe - prominent bones, depressions easily visible between ribs, scapula, spine, shoulders     Dorsal Hand Region  Severe - prominent depression     Patellar Region  Moderate - patella more prominent, less muscle definition around patella     Anterior Thigh Region  Moderate - mild depression on inner thigh     Posterior Calf Region  Severe - thin with very little definition/firmness        Fat Loss    Subcutaneous Fat Loss Findings  Severe     Orbital Region   Severe - pronounced hollowness/depression, dark circles, loose saggy skin     Upper Arm Region  Severe - mostly skin, very little space between folds, fingers touch     Thoracic & Lumbar Region  -- unable to assess d/t LOTS of ascites        Fluid Accumulation (Edema)    Fluid Accumulation   -- lots of ascites        Criteria Met (Must meet criteria for severity in at least 2 of these categories: M Wasting, Fat Loss, Fluid, Secondary Signs, Wt. Status, Intake)    Patient meets criteria for   Severe Malnutrition           Electronically signed by:  Maria R Armendariz RD  03/18/20 14:22

## 2020-03-18 NOTE — ACP (ADVANCE CARE PLANNING)
Patient is only alert and oriented x1.  Patient is concerned about guardianship by wife who is at bedside.  Patient wife does not have those documents on her person.  Patient wife is very eager to complete a living will to help her with medical decisions.  Patient wife would like to talk to ACP provider regarding this.  Per his last known wishes she reports he would like to be full code at this time.        This document has been electronically signed by Bruce Julien III, MD on March 18, 2020 02:55      Dragon disclaimer: Parts of this note were transcribed using dragon dictation software.

## 2020-03-19 NOTE — PLAN OF CARE
Problem: Patient Care Overview  Goal: Plan of Care Review  Outcome: Ongoing (interventions implemented as appropriate)  Flowsheets (Taken 3/19/2020 1146)  Progress: no change  Plan of Care Reviewed With: patient  Outcome Summary: Patient resting in bed much more alert and eating his meals today. Wife at bedside. Vitals are stable. White blood cell count still low but up a little from yesterday. Will continue to monitor.

## 2020-03-19 NOTE — PLAN OF CARE
Problem: Patient Care Overview  Goal: Plan of Care Review  Outcome: Ongoing (interventions implemented as appropriate)  Flowsheets (Taken 3/19/2020 0102)  Progress: no change  Plan of Care Reviewed With: patient; spouse  Outcome Summary: Pt appears to be resting well at this time; wife remains at bedside; will continue to monitor.

## 2020-03-19 NOTE — PLAN OF CARE
Problem: Patient Care Overview  Goal: Plan of Care Review  Outcome: Ongoing (interventions implemented as appropriate)  Flowsheets (Taken 3/19/2020 1526)  Plan of Care Reviewed With: spouse; patient  Outcome Summary: Pt nolvia tx fair with limited participation due to confusion. Pt performed rolling x2 each direction with depx2 .Pt t/f sup-sit with mod/MaxAx1.  Pt performed sit-stand-sit with maxAx1 with posterior lean x 2 attempts.  Pt unable to amb due to posterior lean. No goals met this tx.

## 2020-03-19 NOTE — PROGRESS NOTES
Discharge Planning Assessment  [facility]     Patient Name: Jonnathan Albert  MRN: [id number]  Today's Date: 3/19/2020    Admit Date: 3/17/2020    Discharge Needs Assessment     Row Name 03/19/20 1547       Living Environment    Lives With  spouse    Current Living Arrangements  home/apartment/condo    Primary Care Provided by  spouse/significant other    Provides Primary Care For  no one, unable/limited ability to care for self    Caregiving Concerns  spouse has cared for him since 2009    Family Caregiver if Needed  spouse    Quality of Family Relationships  helpful    Able to Return to Prior Arrangements  yes    Living Arrangement Comments  resides with spouse       Resource/Environmental Concerns    Transportation Concerns  car, none       Transition Planning    Patient/Family Anticipates Transition to  home with family    Patient/Family Anticipated Services at Transition  home health care    Transportation Anticipated  family or friend will provide       Discharge Needs Assessment    Equipment Currently Used at Home  cane, quad;commode;walker, rolling    Anticipated Changes Related to Illness  none    Equipment Needed After Discharge  none    Outpatient/Agency/Support Group Needs  homecare agency    Discharge Coordination/Progress  spouse states that she will take pt back home upon dc.  open to home health and would like someone to be able to come and help her give him a bath 1-2 times a week   his meds are from mail order and he uses [facility] for any short term meds          Discharge Plan    No documentation.       Destination      Coordination has not been started for this encounter.      Durable Medical Equipment      Coordination has not been started for this encounter.      Dialysis/Infusion      Coordination has not been started for this encounter.      Home Medical Care      Coordination has not been started for this encounter.      Therapy      Coordination has not been started for this  encounter.      Community Resources      Coordination has not been started for this encounter.        Expected Discharge Date and Time     Expected Discharge Date Expected Discharge Time    Mar 20, 2020         Demographic Summary     Row Name 03/19/20 1547       General Information    Admission Type  inpatient    Arrived From  home    Required Notices Provided  Important Message from Medicare    Referral Source  high risk screening;physician    Reason for Consult  discharge planning    Preferred Language  English     Used During This Interaction  no    General Information Comments  confirmed address and pharmacy        Functional Status    No documentation.       Psychosocial    No documentation.       Abuse/Neglect    No documentation.       Legal    No documentation.       Substance Abuse    No documentation.       Patient Forms    No documentation.           Marii Leyva RN

## 2020-03-19 NOTE — THERAPY TREATMENT NOTE
Acute Care - Physical Therapy Treatment Note  [facility]     Patient Name: Jonnathan Albert  : 1945  MRN: [id number]  Today's Date: 3/19/2020             Admit Date: 3/17/2020    Visit Dx:    ICD-10-CM ICD-9-CM   1. Poorly controlled ascites R18.8 V49.89   2. Impaired functional mobility, balance, gait, and endurance Z74.09 V49.89   3. Cirrhosis of liver with ascites, unspecified hepatic cirrhosis type (CMS/HCC) K74.60 571.5    R18.8      Patient Active Problem List   Diagnosis   • Essential hypertension   • Cerebrovascular accident (CVA) (CMS/HCC)   • Weakness of left leg   • Gastroesophageal reflux disease   • Benign prostatic hyperplasia   • Rash   • At high risk for falls   • Gait instability   • Impaired fasting glucose   • Vascular dementia with behavior disturbance (CMS/HCC)   • Cerebral atrophy (CMS/HCC)   • Acquired hypothyroidism   • Hepatic cirrhosis (CMS/HCC)   • Cirrhosis of liver with ascites (CMS/HCC)   • Coronary artery disease involving native coronary artery of native heart without angina pectoris   • Vitamin D deficiency   • Vascular dementia without behavioral disturbance (CMS/HCC)   • Severe malnutrition (CMS/HCC)   • Physical deconditioning   • Sacral wound   • Chronic hepatitis C with cirrhosis (CMS/HCC)   • Chronic hepatitis C virus genotype 1a infection (CMS/HCC)       Therapy Treatment    Rehabilitation Treatment Summary     Row Name 20 1408             Treatment Time/Intention    Discipline  physical therapy assistant  -EM      Document Type  therapy note (daily note)  -EM      Mode of Treatment  individual therapy;physical therapy  -EM      Therapy Frequency (PT Clinical Impression)  other (see comments) 6 days/week  -EM      Existing Precautions/Restrictions  fall  -EM      Recorded by [EM] Mitch Marcus, PTA 20 1525      Row Name 20 1408             Vital Signs    Pre Systolic BP Rehab  110  -EM      Pre Treatment Diastolic BP  90  -EM      Post  Systolic BP Rehab  108  -EM      Post Treatment Diastolic BP  92  -EM      Pretreatment Heart Rate (beats/min)  86  -EM      Posttreatment Heart Rate (beats/min)  88  -EM      Pre SpO2 (%)  98  -EM      O2 Delivery Pre Treatment  room air  -EM      Post SpO2 (%)  98  -EM      O2 Delivery Post Treatment  room air  -EM      Pre Patient Position  Supine  -EM      Post Patient Position  Supine  -EM      Recorded by [EM] Mitch Marcus, PTA 03/19/20 1525      Row Name 03/19/20 1408             Cognitive Assessment/Intervention- PT/OT    Affect/Mental Status (Cognitive)  agitated;anxious  -EM      Behavioral Issues (Cognitive)  uncooperative  -EM      Orientation Status (Cognition)  oriented to;person;place  -EM      Follows Commands (Cognition)  0-24% accuracy  -EM      Personal Safety Interventions  fall prevention program maintained;gait belt;supervised activity  -EM      Recorded by [EM] Mitch Marcus, PTA 03/19/20 1525      Row Name 03/19/20 1408             Bed Mobility Assessment/Treatment    Rolling Left [place] (Bed Mobility)  1 person assist;2 person assist;dependent (less than 25% patient effort)  -EM      Rolling Right [place] (Bed Mobility)  1 person assist;2 person assist;dependent (less than 25% patient effort)  -EM      Scooting/Bridging [place] (Bed Mobility)  dependent (less than 25% patient effort);2 person assist  -EM      Supine-Sit [place] (Bed Mobility)  moderate assist (50% patient effort);maximum assist (25% patient effort)  -EM      Sit-Supine [place] (Bed Mobility)  moderate assist (50% patient effort);maximum assist (25% patient effort)  -EM      Comment (Bed Mobility)  Pt uncooperative with verbal instructions, but assist when the activity or t/f is initiated. Pt sat EOB ~15' with SBAx1.   -EM      Recorded by [EM] Mitch Marcus, CEZAR 03/19/20 1525      Row Name 03/19/20 1408             Sit-Stand Transfer    Sit-Stand [place] (Transfers)  moderate assist (50%  patient effort) pt unable to take steps when attempted due to posterior lean  -EM      Assistive Device (Sit-Stand Transfers)  walker, front-wheeled  -EM      Recorded by [EM] Mitch Marcus, PTA 03/19/20 1525      Row Name 03/19/20 1408             Stand-Sit Transfer    Stand-Sit [place] (Transfers)  moderate assist (50% patient effort) nsg applied cream to sacrum while standing.   -EM      Assistive Device (Stand-Sit Transfers)  walker, front-wheeled  -EM      Recorded by [EM] Mitch Marcus, PTA 03/19/20 1525      Row Name 03/19/20 1408             Positioning and Restraints    Pre-Treatment Position  in bed  -EM      Post Treatment Position  bed  -EM      In Bed  notified nsg;call light within reach;encouraged to call for assist;exit alarm on;side lying left  -EM      Recorded by [EM] Mitch Marcus, PTA 03/19/20 1525      Row Name 03/19/20 1408             Pain Scale: Numbers Pre/Post-Treatment    Pain Scale: Numbers, Pretreatment  -- no s/s of pain pre and post tx. No numerical value given.  -EM      Recorded by [EM] Mitch Marcus, PTA 03/19/20 1525      Row Name 03/19/20 1408             Outcome Summary/Treatment Plan (PT)    Daily Summary of Progress (PT)  progress toward functional goals is gradual  -EM      Plan for Continued Treatment (PT)  Continue OOB to chair. Possible therex if pt is able to comprehend for B LE strengthening.   -EM      Anticipated Discharge Disposition (PT)  anticipate therapy at next level of care;home with home health;home with 24/7 care;skilled nursing facility  -EM      Recorded by [EM] Mitch Marcus, PTA 03/19/20 1525        User Key  (r) = Recorded By, (t) = Taken By, (c) = Cosigned By    Initials Name Effective Dates Discipline    EM Mithc Marcus, PTA 08/11/15 -  PT               Rehab Goal Summary     Row Name 03/19/20 1408             Bed Mobility Goal 1 (PT)    Activity/Assistive Device (Bed Mobility Goal 1, PT)  sit to supine;supine to sit  -EM      [place]  Level/Cues Needed (Bed Mobility Goal 1, PT)  contact guard assist;standby assist  -EM      Time Frame (Bed Mobility Goal 1, PT)  by discharge  -EM      Progress/Outcomes (Bed Mobility Goal 1, PT)  goal not met  -EM         Transfer Goal 1 (PT)    Activity/Assistive Device (Transfer Goal 1, PT)  sit-to-stand/stand-to-sit;bed-to-chair/chair-to-bed  -EM      [place] Level/Cues Needed (Transfer Goal 1, PT)  contact guard assist  -EM      Time Frame (Transfer Goal 1, PT)  by discharge  -EM      Progress/Outcome (Transfer Goal 1, PT)  goal not met  -EM         Gait Training Goal 1 (PT)    Activity/Assistive Device (Gait Training Goal 1, PT)  gait (walking locomotion);assistive device use  -EM      [place] Level (Gait Training Goal 1, PT)  contact guard assist;minimum assist (75% or more patient effort)  -EM      Time Frame (Gait Training Goal 1, PT)  by discharge  -EM      Barriers (Gait Training Goal 1, PT)  50ftx2  -EM      Progress/Outcome (Gait Training Goal 1, PT)  goal not met  -EM         Stairs Goal 1 (PT)    Activity/Assistive Device (Stairs Goal 1, PT)  ascending stairs;descending stairs;assistive device use  -EM      [place] Level/Cues Needed (Stairs Goal 1, PT)  contact guard assist  -EM      Time Frame (Stairs Goal 1, PT)  by discharge  -EM      Progress/Outcome (Stairs Goal 1, PT)  goal not met  -EM        User Key  (r) = Recorded By, (t) = Taken By, (c) = Cosigned By    Initials Name Provider Type Discipline    EM Mitch Marcus, PTA Physical Therapy Assistant PT              PT Recommendation and Plan  Anticipated Discharge Disposition (PT): anticipate therapy at next level of care, home with home health, home with 24/7 care, skilled nursing facility  Therapy Frequency (PT Clinical Impression): other (see comments)(6 days/week)  Outcome Summary/Treatment Plan (PT)  Daily Summary of Progress (PT): progress toward functional goals is gradual  Plan for Continued Treatment (PT): Continue OOB to  chair. Possible therex if pt is able to comprehend for B LE strengthening.   Anticipated Discharge Disposition (PT): anticipate therapy at next level of care, home with home health, home with 24/7 care, skilled nursing facility  Plan of Care Reviewed With: spouse, patient  Outcome Summary: Pt nolvia tx fair with limited participation due to confusion. Pt performed rolling x2 each direction with depx2 .Pt t/f sup-sit with mod/MaxAx1.  Pt performed sit-stand-sit with maxAx1 with posterior lean x 2 attempts.  Pt unable to amb due to posterior lean. No goals met this tx.     Time Calculation:   PT Charges     Row Name 03/19/20 1529             Time Calculation    Start Time  1408  -EM      Stop Time  1448  -EM      Time Calculation (min)  40 min  -EM         Time Calculation- PT    Total Timed Code Minutes- PT  40 minute(s)  -EM        User Key  (r) = Recorded By, (t) = Taken By, (c) = Cosigned By    Initials Name Provider Type    EM Mitch Marcus PTA Physical Therapy Assistant        Therapy Charges for Today     Code Description Service Date Service Provider Modifiers Qty    [id number]  PT THERAPEUTIC ACT EA 15 MIN 3/19/2020 Mitch Marcus PTA GP 3          PT G-Codes  Outcome Measure Options: AM-PAC 6 Clicks Basic Mobility (PT)  AM-PAC 6 Clicks Score (PT): 12    Mitch Marcus PTA  3/19/2020

## 2020-03-19 NOTE — CONSULTS
Met with spouse and she is not interested in placing pt into a facility.  She plans to take him back home.  Sitters list from our facility was given to her.  Recommended that she contact [facility] CM department to see about getting a sitter's list from their area.   She is open to home health if needed.

## 2020-03-19 NOTE — PROGRESS NOTES
FAMILY MEDICINE DAILY PROGRESS NOTE    NAME: Jonnathan Albert  : 1945  MRN: [id number]      LOS: 1 day     PROVIDER OF SERVICE: Tyra Bloom MD    Chief Complaint: Cirrhosis of liver with ascites (CMS/HCC)    Subjective:     Patient seen at: 0813  Interval History: patient family    No acute events overnight.  Paracentesis from yesterday removed 6.2 L, cultures pending.  Wife provided most of the history, states that patient woke up with an appetite which is a good sign.  Since November his mentation has decreased and he has become increasingly incontinent.    Review of Systems:   Review of Systems   Constitutional: Negative for chills and fever.   HENT: Negative for sore throat and trouble swallowing.    Eyes: Negative for photophobia and visual disturbance.   Respiratory: Negative for cough and shortness of breath.    Cardiovascular: Negative for chest pain and leg swelling.   Gastrointestinal: Negative for abdominal pain and nausea.   Genitourinary: Negative for difficulty urinating and flank pain.   Musculoskeletal: Negative for back pain.   Skin: Negative for wound.   Neurological: Negative for dizziness and headaches.   Psychiatric/Behavioral: Negative for confusion. The patient is not nervous/anxious.        Objective:     Vital Signs  Temp:  [96.5 °F (35.8 °C)-98.6 °F (37 °C)] 97.7 °F (36.5 °C)  Heart Rate:  [61-84] 71  Resp:  [18] 18  BP: (122-145)/(79-83) 129/79  Body mass index is 21.74 kg/m².    Physical Exam  Physical Exam   Constitutional: He is oriented to person, place, and time. Vital signs are normal. He appears well-developed.   HENT:   Head: Normocephalic.   Right Ear: Hearing normal.   Left Ear: Hearing normal.   Nose: Nose normal.   Mouth/Throat: Uvula is midline and mucous membranes are normal.   Eyes: Pupils are equal, round, and reactive to light. Conjunctivae and lids are normal.   Neck: Neck supple.   Cardiovascular: Normal rate, regular rhythm and normal heart sounds.      Pulmonary/Chest: Effort normal and breath sounds normal.   Abdominal: Soft. Bowel sounds are normal. There is no tenderness. There is no guarding.   Neurological: He is alert and oriented to person, place, and time.   Skin: Skin is warm.   Psychiatric: He has a normal mood and affect. His behavior is normal. Judgment and thought content normal. His speech is delayed. Cognition and memory are normal.   Vitals reviewed.      Medication Review    Current Facility-Administered Medications:   •  amLODIPine (NORVASC) tablet 5 mg, 5 mg, Oral, Daily, Bruce Julien III, MD, 5 mg at 03/18/20 1058  •  aspirin chewable tablet 81 mg, 81 mg, Oral, Daily, Bruce Julien III, MD, 81 mg at 03/18/20 1058  •  cefTRIAXone (ROCEPHIN) 2 g/100 mL 0.9% NS VTB (VIRGINIE), 2 g, Intravenous, Q24H, Steffany Child MD, 2 g at 03/18/20 1700  •  cholecalciferol (VITAMIN D3) tablet 2,000 Units, 2,000 Units, Oral, Daily, Bruce Julien III, MD, 2,000 Units at 03/18/20 1058  •  famotidine (PEPCID) tablet 40 mg, 40 mg, Oral, Daily, Bruce Julien III, MD, 40 mg at 03/18/20 1057  •  furosemide (LASIX) tablet 40 mg, 40 mg, Oral, Daily, Bruce Julien III, MD, 40 mg at 03/18/20 1058  •  levothyroxine (SYNTHROID, LEVOTHROID) tablet 50 mcg, 50 mcg, Oral, Q AM, Steffany Child MD, 50 mcg at 03/19/20 0603  •  memantine (NAMENDA) tablet 5 mg, 5 mg, Oral, BID, Bruce Julien III, MD, 5 mg at 03/18/20 2027  •  morphine injection 2 mg, 2 mg, Intravenous, Q4H PRN, Steffany Child MD  •  ondansetron (ZOFRAN) tablet 4 mg, 4 mg, Oral, Q6H PRN **OR** ondansetron (ZOFRAN) injection 4 mg, 4 mg, Intravenous, Q6H PRN, Bruce Julien III, MD  •  ramipril (ALTACE) capsule 5 mg, 5 mg, Oral, Daily, Bruce Julien III, MD, 5 mg at 03/18/20 1058  •  [COMPLETED] Insert peripheral IV, , , Once **AND** sodium chloride 0.9 % flush 10 mL, 10 mL, Intravenous, PRN, Bruce Julien III, MD  •  sodium chloride 0.9 % flush 10 mL, 10  mL, Intravenous, Q12H, Bruce Julien III, MD, 10 mL at 03/18/20 2027  •  sodium chloride 0.9 % flush 10 mL, 10 mL, Intravenous, PRN, Bruce Julien III, MD  •  tamsulosin (FLOMAX) 24 hr capsule 0.8 mg, 0.8 mg, Oral, Nightly, Bruce Julien III, MD, 0.8 mg at 03/18/20 2027     Diagnostic Data    Lab Results (last 24 hours)     Procedure Component Value Units Date/Time    Ferritin [[id number]]  (Normal) Collected:  03/19/20 0624    Specimen:  Blood Updated:  03/19/20 0722     Ferritin 233.60 ng/mL     Narrative:       Results may be falsely decreased if patient taking Biotin.      CBC Auto Differential [[id number]]  (Abnormal) Collected:  03/19/20 0624    Specimen:  Blood Updated:  03/19/20 0702     WBC 1.97 10*3/mm3      RBC 3.13 10*6/mm3      Hemoglobin 10.0 g/dL      Hematocrit 28.8 %      MCV 92.0 fL      MCH 31.9 pg      MCHC 34.7 g/dL      RDW 15.6 %      RDW-SD 52.2 fl      MPV 12.1 fL      Platelets 96 10*3/mm3      Neutrophil % 50.7 %      Lymphocyte % 39.1 %      Monocyte % 6.1 %      Eosinophil % 3.6 %      Basophil % 0.0 %      Immature Grans % 0.5 %      Neutrophils, Absolute 1.00 10*3/mm3      Lymphocytes, Absolute 0.77 10*3/mm3      Monocytes, Absolute 0.12 10*3/mm3      Eosinophils, Absolute 0.07 10*3/mm3      Basophils, Absolute 0.00 10*3/mm3      Immature Grans, Absolute 0.01 10*3/mm3      nRBC 0.0 /100 WBC     Reticulocytes [[id number]]  (Normal) Collected:  03/19/20 0624    Specimen:  Blood Updated:  03/19/20 0656     Reticulocyte % 0.89 %      Reticulocyte Absolute 0.0279 10*6/mm3     Comprehensive Metabolic Panel [[id number]] Collected:  03/19/20 0624    Specimen:  Blood Updated:  03/19/20 0652    Iron Profile [[id number]] Collected:  03/19/20 0624    Specimen:  Blood Updated:  03/19/20 0652    Vitamin B12 [[id number]] Collected:  03/19/20 0624    Specimen:  Blood Updated:  03/19/20 0651    Folate [[id number]] Collected:  03/19/20 0624    Specimen:  Blood Updated:  03/19/20 0651     Methylmalonic Acid, Serum [[id number]] Collected:  03/19/20 0624    Specimen:  Blood Updated:  03/19/20 0651    Body Fluid Culture - Body Fluid, Peritoneum [[id number]] Collected:  03/18/20 1048    Specimen:  Body Fluid from Peritoneum Updated:  03/18/20 2300     Body Fluid Culture No growth at less than 24 hours     Gram Stain Rare (1+) WBCs seen      No organisms seen    Amylase, Body Fluid - Body Fluid, Peritoneum [[id number]] Collected:  03/18/20 1048    Specimen:  Body Fluid from Peritoneum Updated:  03/18/20 1914     Amylase, Fluid 31 U/L     Narrative:       No Reference Ranges Established.    This test was developed, it performance characteristics determined and judged suitable for clinical purposes by [facility].  It has not been cleared or approved by the FDA.  The laboratory is regulated under CLIA as qualified to perform high-complexity testing.     Lactate Dehydrogenase, Body Fluid - Body Fluid, Peritoneum [[id number]] Collected:  03/18/20 1048    Specimen:  Body Fluid from Peritoneum Updated:  03/18/20 1914     Lactate Dehydrogenase (LD), Fluid 85 U/L     Narrative:       No Reference Ranges Established.    Serous fluid LDH greater than 60 percent of the serum LDH or serous fluid LDH two-thirds of the upper limit of normal for serum LDH suggests the fluid is an exudate.     1. Pleural TP/Serum TP >0.5  2. Pleural LD/Serum LD >0.6  3. Pleural LD >2/3 of the upper limit of normal for serum LDH    This test was developed, it performance characteristics determined and judged suitable for clinical purposes by [facility].  It has not been cleared or approved by the FDA.  The laboratory is regulated under CLIA as qualified to perform high-complexity testing.     Albumin, Fluid - Body Fluid, Peritoneum [[id number]] Collected:  03/18/20 1048    Specimen:  Body Fluid from Peritoneum Updated:  03/18/20 1914     Albumin, Fluid 1.50 g/dL     Narrative:       No  Reference Ranges Established.    A Serous fluid albumin gradient (serum albumin-fluid) <1.1 g/dL suggests the fluid is an exudate.  Cirrhosis usually results in an ascites fluid albumin gradient >1.1 g/dL.    This test was developed, its performance characteristics determined and judged suitable for clinical purposes by [facility].  It has not been cleared or approved by the FDA.  The laboratory is regulated under CLIA as qualified to perfom high-complexity testing.      Body Fluid Cell Count With Differential - Body Fluid, Peritoneum [[id number]] Collected:  03/18/20 1048    Specimen:  Body Fluid from Peritoneum Updated:  03/18/20 1246    Narrative:       The following orders were created for panel order Body Fluid Cell Count With Differential - Body Fluid, Peritoneum.  Procedure                               Abnormality         Status                     ---------                               -----------         ------                     Body fluid cell count - ...[[id number]]  Abnormal            Final result               Body fluid differential ...[[id number]]                      Final result                 Please view results for these tests on the individual orders.    Body fluid differential - Body Fluid, Peritoneum [[id number]] Collected:  03/18/20 1048    Specimen:  Body Fluid from Peritoneum Updated:  03/18/20 1246     Neutrophils, Fluid 11 %      Mononuclear, Fluid 89 %     Narrative:       Reference intervals are unavailable for this body fluid differential. Comparison of the result with concentration in the blood, serum or plasma is recommended.    Body fluid cell count - Body Fluid, Peritoneum [[id number]]  (Abnormal) Collected:  03/18/20 1048    Specimen:  Body Fluid from Peritoneum Updated:  03/18/20 1234     WBC, Fluid 122.5 /mm3      RBC, Fluid 293 /mm3      Color, Fluid Yellow     Appearance, Fluid Slightly Cloudy     Volume, Fluid 8,400.0 mL     CBC & Differential  [[id number]] Collected:  03/18/20 1104    Specimen:  Blood Updated:  03/18/20 1119    Narrative:       The following orders were created for panel order CBC & Differential.  Procedure                               Abnormality         Status                     ---------                               -----------         ------                     CBC Auto Differential[[id number]]        Abnormal            Final result                 Please view results for these tests on the individual orders.    CBC Auto Differential [[id number]]  (Abnormal) Collected:  03/18/20 1104    Specimen:  Blood Updated:  03/18/20 1119     WBC 1.75 10*3/mm3      RBC 2.81 10*6/mm3      Hemoglobin 9.3 g/dL      Hematocrit 25.6 %      MCV 91.1 fL      MCH 33.1 pg      MCHC 36.3 g/dL      RDW 15.7 %      RDW-SD 52.5 fl      MPV 11.3 fL      Platelets 86 10*3/mm3      Neutrophil % 45.6 %      Lymphocyte % 38.9 %      Monocyte % 10.9 %      Eosinophil % 4.0 %      Basophil % 0.0 %      Immature Grans % 0.6 %      Neutrophils, Absolute 0.80 10*3/mm3      Lymphocytes, Absolute 0.68 10*3/mm3      Monocytes, Absolute 0.19 10*3/mm3      Eosinophils, Absolute 0.07 10*3/mm3      Basophils, Absolute 0.00 10*3/mm3      Immature Grans, Absolute 0.01 10*3/mm3      nRBC 0.0 /100 WBC     Anaerobic Culture - Body Fluid, Peritoneum [[id number]] Collected:  03/18/20 1048    Specimen:  Body Fluid from Peritoneum Updated:  03/18/20 1048            I reviewed the patient's new clinical results.  Discussed with Dr. Child    Assessment/Plan:     Active Hospital Problems    Diagnosis POA   • **Cirrhosis of liver with ascites (CMS/HCC) [K74.60, R18.8] Yes     Patient with chronic liver disease related to hepatitis C.     Patient seen gastroenterology Dr. Rivera today.    - Ultrasound by interventional radiology for paracentesis today  -Body fluid cell count amylase lipase and albumin to calculate SAAG score.  -Furosemide 40 mg p.o. daily  -Daily standing  weights  -Strict I's and O's  - Regular diet with 1.5 L fluid restriction  -Ammonia within normal limits     • Coronary artery disease involving native coronary artery of native heart without angina pectoris [I25.10] Yes     We will continue most of his home meds holding dual antiplatelet therapy with paracentesis planned.  -Aspirin 81 mg p.o. every morning  -Holding clopidogrel 75 mg p.o. daily holding  - Ramipril 5 mg p.o. daily     • Vitamin D deficiency [E55.9] Yes     Resume home medications.  -Cholecalciferol 2000 units p.o. daily     • Vascular dementia without behavioral disturbance (CMS/HCC) [F01.50] Yes     Patient with altered mental status only acute alert and oriented x1.  Patient is currently conserved by guardianship from his wife for legal decisions.  We will continue home medication to help phyllis further memory loss.  -Memantine 5 mg p.o. twice daily     • Severe malnutrition (CMS/HCC) [E43] Yes     Body mass index is 22.82 kg/m².  Despite this patient has prominence of clavicles and depressed sunken in skin in his acromioclavicular spaces.  -Nutrition consult for malnutrition severity index  -Patient with hypoalbuminemia likely related to chronic decompensated liver disease       • Physical deconditioning [R53.81] Yes     Patient has been bedbound with multiple TIAs.  Bedbound status began in November 2019.  Patient with minimal ambulation to bathroom in or out of home.  Possible need for placement with wife being sole caregiver.  -PT and OT evaluate and treat  -Social work consult     • Sacral wound [S31.000A] Yes     Patient with rough keratotic skin over sacral process.  This is in stage of healing.  This is not qualified stage I ulcer, however would benefit from barrier protection.  -Wound care twice daily     • Chronic hepatitis C with cirrhosis (CMS/HCC) [B18.2, K74.60] Yes   • Chronic hepatitis C virus genotype 1a infection (CMS/HCC) [B18.2] Yes   • Acquired hypothyroidism [E03.9] Yes      -Levothyroxine 50 mcg p.o. every morning     • Benign prostatic hyperplasia [N40.0] Yes     We will continue home alpha blockade.  -Tamsulosin 0.8 mg p.o. nightly     • Gastroesophageal reflux disease [K21.9] Yes     We will resume patient's home H2 blockade.  -Famotidine 40 mg p.o. daily     • Essential hypertension [I10] Yes     We will continue home medications and monitor blood pressure related to fluid shifts around paracentesis.  -Amlodipine 5 mg p.o. daily  - Furosemide 40 mg p.o. daily  -Ramipril 5 mg p.o. daily  -Vital signs per unit protocol         DVT prophylaxis:   Mechanical Order History:      Ordered        03/18/20 1253  Place Sequential Compression Device  Once         03/18/20 1253  Maintain Sequential Compression Device  Continuous                 Pharmalogical Order History:     Ordered     Dose Route Frequency Stop    03/18/20 0031  enoxaparin (LOVENOX) syringe 40 mg  Status:  Discontinued      40 mg SC Every 24 Hours 03/18/20 1222         Code status is   Code Status and Medical Interventions:   Ordered at: 03/18/20 0031     Level Of Support Discussed With:    Health Care Surrogate    Next of Kin (If No Surrogate)     Code Status:    CPR     Medical Interventions (Level of Support Prior to Arrest):    Full     Comments:    Pt is conserved by wife       Plan for disposition: Where: home and home health and When:  4days      Time: 10 minutes        This document has been electronically signed by Tyra Bloom MD on March 19, 2020 07:22   Portions of this note were dictated using Dragon.

## 2020-03-19 NOTE — PROGRESS NOTES
SUBJECTIVE:   3/19/2020  Chief Complaint:     Subjective      Patient feels better today.  Awake, alert and verbalizing.  No GI complaints at this time.  Patient's wife at bedside.  She is requesting endoscopy to be performed during the visit to avoid multiple trips to the hospital given the difficulty with transportation.    History:  Past Medical History:   Diagnosis Date   • Anxiety state    • Chronic hepatitis C (CMS/HCC)    • Dementia (CMS/HCC)    • Depressive disorder    • Encounter for screening for malignant neoplasm of prostate    • Epigastric pain    • Essential hypertension    • Heartburn    • Impacted cerumen    • Prostatitis    • Special screening for malignant neoplasm of colon    • Stroke (CMS/HCC)    • Urinary tract infectious disease    • Vascular insufficiency      Past Surgical History:   Procedure Laterality Date   • VASCULAR SURGERY       Family History   Problem Relation Age of Onset   • Dementia Other    • Diabetes Other    • Heart disease Other    • Hypertension Other    • Alcohol abuse Other    • Lung cancer Other    • Rheum arthritis Other    • Stroke Other      Social History     Tobacco Use   • Smoking status: Former Smoker   • Smokeless tobacco: Never Used   Substance Use Topics   • Alcohol use: Not Currently   • Drug use: Not Currently     Types: Heroin     Medications Prior to Admission   Medication Sig Dispense Refill Last Dose   • amLODIPine (NORVASC) 5 MG tablet Take 1 tablet by mouth Daily. 90 tablet 1 Taking   • aspirin 81 MG chewable tablet Chew 1 tablet Daily. 90 tablet 1 Taking   • clopidogrel (PLAVIX) 75 MG tablet Take 1 tablet by mouth Daily. 90 tablet 1 Taking   • famotidine (PEPCID) 40 MG tablet Take 1 tablet by mouth Daily. 90 tablet 3 Taking   • memantine (NAMENDA) 5 MG tablet Take 1 tablet by mouth 2 (Two) Times a Day. 180 tablet 1 Taking   • MILK THISTLE PO Take  by mouth.   Taking   • Misc. Devices (COMMODE BEDSIDE) misc Bedside commode to be used as needed due  to difficulty with ambulation and mobility. 1 each 0 Taking   • ramipril (ALTACE) 5 MG capsule TAKE 1 CAPSULE DAILY 90 capsule 1 Taking   • tamsulosin (FLOMAX) 0.4 MG capsule 24 hr capsule Take 2 capsules by mouth Every Night. 180 capsule 1 Taking   • levothyroxine (SYNTHROID) 50 MCG tablet Take 1 tablet by mouth Daily. (Patient taking differently: Take 25 mcg by mouth Daily.) 30 tablet 3 Taking   • miconazole (LOTRIMIN AF) 2 % powder Apply  topically to the appropriate area as directed As Needed for Itching. 85 g 5 Taking   • TRACE MIN CACRCUFEKMGMNPSEZN PO Take 1 tablet by mouth Daily.   Taking   • vitamin D (ERGOCALCIFEROL) 1.25 MG (31074 UT) capsule capsule Take 1 capsule by mouth Every 7 (Seven) Days. 4 capsule 3 Taking     Allergies:  Patient has no known allergies.     CURRENT MEDICATIONS/OBJECTIVE/VS/PE:     Current Medications:     Current Facility-Administered Medications   Medication Dose Route Frequency Provider Last Rate Last Dose   • amLODIPine (NORVASC) tablet 5 mg  5 mg Oral Daily Bruce Julien III, MD   5 mg at 03/19/20 0845   • aspirin chewable tablet 81 mg  81 mg Oral Daily Bruce Julien III, MD   81 mg at 03/19/20 0845   • cefTRIAXone (ROCEPHIN) 2 g/100 mL 0.9% NS VTB (VIRGINIE)  2 g Intravenous Q24H Steffany Child MD   2 g at 03/18/20 1700   • cholecalciferol (VITAMIN D3) tablet 2,000 Units  2,000 Units Oral Daily Bruce Julien III, MD   2,000 Units at 03/19/20 0844   • famotidine (PEPCID) tablet 40 mg  40 mg Oral Daily Bruce Julien III, MD   40 mg at 03/19/20 0845   • ferrous sulfate EC tablet 324 mg  324 mg Oral Daily With Breakfast Steffany Child MD   324 mg at 03/19/20 1247   • furosemide (LASIX) tablet 40 mg  40 mg Oral Daily Bruce Julien III, MD   40 mg at 03/19/20 0844   • levothyroxine (SYNTHROID, LEVOTHROID) tablet 50 mcg  50 mcg Oral Q AM Steffany Child, MD   50 mcg at 03/19/20 0603   • memantine (NAMENDA) tablet 5 mg  5 mg Oral BID O'Krish,  Bruce MCCARTY III, MD   5 mg at 03/19/20 0844   • morphine injection 2 mg  2 mg Intravenous Q4H PRN Steffany Child MD       • ondansetron (ZOFRAN) tablet 4 mg  4 mg Oral Q6H PRN Bruce Julien III, MD        Or   • ondansetron (ZOFRAN) injection 4 mg  4 mg Intravenous Q6H PRN Bruce Julien III, MD       • ramipril (ALTACE) capsule 5 mg  5 mg Oral Daily MANUEL'Bruce Rutherford III, MD   5 mg at 03/19/20 0844   • sodium chloride 0.9 % flush 10 mL  10 mL Intravenous PRN Bruce Julien III, MD       • sodium chloride 0.9 % flush 10 mL  10 mL Intravenous Q12H Bruce Julien III, MD   10 mL at 03/19/20 0844   • sodium chloride 0.9 % flush 10 mL  10 mL Intravenous PRN Bruce Julien III, MD       • tamsulosin (FLOMAX) 24 hr capsule 0.8 mg  0.8 mg Oral Nightly Bruce Julien III, MD   0.8 mg at 03/18/20 2027       Objective     Review of Systems:   Review of Systems   Constitutional: Negative for chills, fatigue, fever and unexpected weight change.   HENT: Negative for congestion, ear discharge, hearing loss, nosebleeds and sore throat.    Eyes: Negative for pain, discharge and redness.   Respiratory: Negative for cough, chest tightness, shortness of breath and wheezing.    Cardiovascular: Negative for chest pain and palpitations.   Gastrointestinal: Negative for abdominal distention, abdominal pain, blood in stool, constipation, diarrhea, nausea and vomiting.   Endocrine: Negative for cold intolerance, polydipsia, polyphagia and polyuria.   Genitourinary: Negative for dysuria, flank pain, frequency, hematuria and urgency.   Musculoskeletal: Negative for arthralgias, back pain, joint swelling and myalgias.   Skin: Negative for color change, pallor and rash.   Neurological: Negative for tremors, seizures, syncope, weakness and headaches.   Hematological: Negative for adenopathy. Does not bruise/bleed easily.   Psychiatric/Behavioral: Negative for behavioral problems, confusion, dysphoric mood,  hallucinations and suicidal ideas. The patient is not nervous/anxious.        Physical Exam:   Temp:  [96.5 °F (35.8 °C)-98.6 °F (37 °C)] 98 °F (36.7 °C)  Heart Rate:  [61-89] 79  Resp:  [18-20] 20  BP: (122-145)/(68-83) 122/68     Physical Exam:  General Appearance:    Alert, cooperative, in no acute distress   Head:    Normocephalic, without obvious abnormality, atraumatic   Eyes:            Lids and lashes normal, conjunctivae and sclerae normal, no   icterus, no pallor, corneas clear, PERRLA   Ears:    Ears appear intact with no abnormalities noted   Throat:   No oral lesions, no thrush, oral mucosa moist   Neck:   No adenopathy, supple, trachea midline, no thyromegaly, no     carotid bruit, no JVD   Back:     No kyphosis present, no scoliosis present, no skin lesions,       erythema or scars, no tenderness to percussion or                   palpation,   range of motion normal   Lungs:     Clear to auscultation,respirations regular, even and                   unlabored    Heart:    Regular rhythm and normal rate, normal S1 and S2, no            murmur, no gallop, no rub, no click   Breast Exam:    Deferred   Abdomen:     Normal bowel sounds, no masses, no organomegaly, soft        non-tender, non-distended, no guarding, no rebound                 tenderness   Genitalia:    Deferred   Extremities:   Moves all extremities well, no edema, no cyanosis, no              redness   Pulses:   Pulses palpable and equal bilaterally   Skin:   No bleeding, bruising or rash   Lymph nodes:   No palpable adenopathy   Neurologic:   Cranial nerves 2 - 12 grossly intact, sensation intact, DTR        present and equal bilaterally      Results Review:     Lab Results (last 24 hours)     Procedure Component Value Units Date/Time    Vitamin B12 [[id number]]  (Normal) Collected:  03/19/20 0624    Specimen:  Blood Updated:  03/19/20 1222     Vitamin B-12 412 pg/mL     Narrative:       Results may be falsely increased if patient taking  Biotin.      Folate [[id number]]  (Normal) Collected:  03/19/20 0624    Specimen:  Blood Updated:  03/19/20 1222     Folate >20.00 ng/mL     Narrative:       Results may be falsely increased if patient taking Biotin.      Body Fluid Culture - Body Fluid, Peritoneum [[id number]] Collected:  03/18/20 1048    Specimen:  Body Fluid from Peritoneum Updated:  03/19/20 1100     Body Fluid Culture No growth at 24 hours     Gram Stain Rare (1+) WBCs seen      No organisms seen    Comprehensive Metabolic Panel [[id number]]  (Abnormal) Collected:  03/19/20 0624    Specimen:  Blood Updated:  03/19/20 0724     Glucose 110 mg/dL      BUN 15 mg/dL      Creatinine 0.91 mg/dL      Sodium 141 mmol/L      Potassium 3.5 mmol/L      Chloride 109 mmol/L      CO2 21.0 mmol/L      Calcium 7.9 mg/dL      Total Protein 7.0 g/dL      Albumin 2.90 g/dL      ALT (SGPT) 8 U/L      AST (SGOT) 20 U/L      Alkaline Phosphatase 39 U/L      Total Bilirubin 0.4 mg/dL      eGFR  African Amer 98 mL/min/1.73      Globulin 4.1 gm/dL      A/G Ratio 0.7 g/dL      BUN/Creatinine Ratio 16.5     Anion Gap 11.0 mmol/L     Narrative:       GFR Normal >60  Chronic Kidney Disease <60  Kidney Failure <15      Iron Profile [[id number]]  (Abnormal) Collected:  03/19/20 0624    Specimen:  Blood Updated:  03/19/20 0724     Iron 46 mcg/dL      Iron Saturation 26 %      Transferrin 118 mg/dL      TIBC 176 mcg/dL     Ferritin [[id number]]  (Normal) Collected:  03/19/20 0624    Specimen:  Blood Updated:  03/19/20 0722     Ferritin 233.60 ng/mL     Narrative:       Results may be falsely decreased if patient taking Biotin.      CBC Auto Differential [[id number]]  (Abnormal) Collected:  03/19/20 0624    Specimen:  Blood Updated:  03/19/20 0702     WBC 1.97 10*3/mm3      RBC 3.13 10*6/mm3      Hemoglobin 10.0 g/dL      Hematocrit 28.8 %      MCV 92.0 fL      MCH 31.9 pg      MCHC 34.7 g/dL      RDW 15.6 %      RDW-SD 52.2 fl      MPV 12.1 fL      Platelets 96 10*3/mm3       Neutrophil % 50.7 %      Lymphocyte % 39.1 %      Monocyte % 6.1 %      Eosinophil % 3.6 %      Basophil % 0.0 %      Immature Grans % 0.5 %      Neutrophils, Absolute 1.00 10*3/mm3      Lymphocytes, Absolute 0.77 10*3/mm3      Monocytes, Absolute 0.12 10*3/mm3      Eosinophils, Absolute 0.07 10*3/mm3      Basophils, Absolute 0.00 10*3/mm3      Immature Grans, Absolute 0.01 10*3/mm3      nRBC 0.0 /100 WBC     Reticulocytes [[id number]]  (Normal) Collected:  03/19/20 0624    Specimen:  Blood Updated:  03/19/20 0656     Reticulocyte % 0.89 %      Reticulocyte Absolute 0.0279 10*6/mm3     Methylmalonic Acid, Serum [[id number]] Collected:  03/19/20 0624    Specimen:  Blood Updated:  03/19/20 0651    Amylase, Body Fluid - Body Fluid, Peritoneum [[id number]] Collected:  03/18/20 1048    Specimen:  Body Fluid from Peritoneum Updated:  03/18/20 1914     Amylase, Fluid 31 U/L     Narrative:       No Reference Ranges Established.    This test was developed, it performance characteristics determined and judged suitable for clinical purposes by [facility].  It has not been cleared or approved by the FDA.  The laboratory is regulated under CLIA as qualified to perform high-complexity testing.     Lactate Dehydrogenase, Body Fluid - Body Fluid, Peritoneum [[id number]] Collected:  03/18/20 1048    Specimen:  Body Fluid from Peritoneum Updated:  03/18/20 1914     Lactate Dehydrogenase (LD), Fluid 85 U/L     Narrative:       No Reference Ranges Established.    Serous fluid LDH greater than 60 percent of the serum LDH or serous fluid LDH two-thirds of the upper limit of normal for serum LDH suggests the fluid is an exudate.     1. Pleural TP/Serum TP >0.5  2. Pleural LD/Serum LD >0.6  3. Pleural LD >2/3 of the upper limit of normal for serum LDH    This test was developed, it performance characteristics determined and judged suitable for clinical purposes by [facility].  It has not been  cleared or approved by the FDA.  The laboratory is regulated under CLIA as qualified to perform high-complexity testing.     Albumin, Fluid - Body Fluid, Peritoneum [[id number]] Collected:  03/18/20 1048    Specimen:  Body Fluid from Peritoneum Updated:  03/18/20 1914     Albumin, Fluid 1.50 g/dL     Narrative:       No Reference Ranges Established.    A Serous fluid albumin gradient (serum albumin-fluid) <1.1 g/dL suggests the fluid is an exudate.  Cirrhosis usually results in an ascites fluid albumin gradient >1.1 g/dL.    This test was developed, its performance characteristics determined and judged suitable for clinical purposes by [facility].  It has not been cleared or approved by the FDA.  The laboratory is regulated under CLIA as qualified to perfom high-complexity testing.             I reviewed the patient's new clinical results.  I reviewed the patient's new imaging results and agree with the interpretation.     ASSESSMENT/PLAN:   ASSESSMENT: 1.  Ascites, status post large-volume paracentesis.  2.  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  Dementia  4.  Urinary and fecal incontinence, likely due to dementia and previous CVA  5.  Variceal screening  PLAN: No added salt diet.  Lasix and Aldactone  Proceed with EGD in a.m. for variceal screening.  Recommend placement  The risks, benefits, and alternatives of this procedure have been discussed with the patient or the responsible party- the patient understands and agrees to proceed.         Singh Rivera MD  03/19/20  15:19

## 2020-03-19 NOTE — NURSING NOTE
Patient's spouse called me to the room and stated that the GI doctor had came in and told them she was going to do an upper scope tomorrow and she left the room before the patient's wife could tell her that they did not want the scope. She is the designated decision maker for the patient and she states that she is refusing to have this procedure done at this time. She only wishes to have the scope at a later date if the patient begins to have symptoms that needs to be addressed. Called Chantel in endoscopy and informed her of this information and she stated they would leave him on the schedule for now and she had let Dr. Rivera know and let the charge nurse know in the AM.

## 2020-03-19 NOTE — NURSING NOTE
Patient has scar tissue on his coccyx region. It is dry and doesn't have any openings. Patient's family stated he has had this for awhile. Needs good skin care, offloading and protection. POA yes Z-Guard being used.

## 2020-03-20 NOTE — PLAN OF CARE
Got a phone call from the nurse stating that the wife was now surprised to be discharged today, and she was under the impression of something different.    Went up to speak with the wife, she states that she was never told she was being discharged today.  She states that she now wonders why nursing home was not pursued.  I explained to the patient that Dr. Rievra had commented that placement would be ideal since she had previously stated she cannot take care of the patient at home.  When I went to speak with her this morning on rounds, she stated that it was against the patient's wishes and his sons to place him in a nursing home, and that she would take him home and take care of him herself.  Nursing home was not pursued per her stating those were his wishes.    It was explained to her on rounds, with Dr. Moncada, that she would be going home today and the EGD would be done in Dr. Rivera's office this coming Monday.  It was explained to her several times that Dr. Rivera's recommendations were to send him home on the Lasix and the Aldactone to prevent reaccumulation of fluid in his abdomen.  At the time of rounds the patient consented and was in agreement.    At the time of discharge this all came as a surprise, I took time and explained to her as clearly as possible and she eventually consented and agreed to go home.  She continues to express the struggles of taking care of him at home with his constant urination.  I reiterated the recommendation based on her comments on nursing home placement.    I advised the patient to call Dr. Rivera's office on Monday to see when the EGD would be scheduled, and that he was to have nothing by mouth, no liquid no solids no food, after midnight Sunday night.      This document has been electronically signed by Tyra Bloom MD on March 20, 2020 16:42

## 2020-03-20 NOTE — THERAPY EVALUATION
Acute Care - Occupational Therapy Initial Evaluation  [facility]     Patient Name: Jonnathan Albert  : 1945  MRN: [id number]  Today's Date: 3/20/2020  Onset of Illness/Injury or Date of Surgery: 20  Date of Referral to OT: 20  Referring Physician: JOSSELYN Pitts MD    Admit Date: 3/17/2020       ICD-10-CM ICD-9-CM   1. Poorly controlled ascites R18.8 V49.89   2. Impaired functional mobility, balance, gait, and endurance Z74.09 V49.89   3. Cirrhosis of liver with ascites, unspecified hepatic cirrhosis type (CMS/HCC) K74.60 571.5    R18.8    4. Vascular dementia with behavior disturbance (CMS/HCC) F01.51 290.40   5. Impaired mobility and activities of daily living Z74.09 799.89     Patient Active Problem List   Diagnosis   • Essential hypertension   • Cerebrovascular accident (CVA) (CMS/HCC)   • Weakness of left leg   • Gastroesophageal reflux disease   • Benign prostatic hyperplasia   • Rash   • At high risk for falls   • Gait instability   • Impaired fasting glucose   • Vascular dementia with behavior disturbance (CMS/HCC)   • Cerebral atrophy (CMS/HCC)   • Acquired hypothyroidism   • Hepatic cirrhosis (CMS/HCC)   • Cirrhosis of liver with ascites (CMS/HCC)   • Coronary artery disease involving native coronary artery of native heart without angina pectoris   • Vitamin D deficiency   • Vascular dementia without behavioral disturbance (CMS/HCC)   • Severe malnutrition (CMS/HCC)   • Physical deconditioning   • Sacral wound   • Chronic hepatitis C with cirrhosis (CMS/HCC)   • Chronic hepatitis C virus genotype 1a infection (CMS/HCC)     Past Medical History:   Diagnosis Date   • Anxiety state    • Chronic hepatitis C (CMS/HCC)    • Dementia (CMS/HCC)    • Depressive disorder    • Encounter for screening for malignant neoplasm of prostate    • Epigastric pain    • Essential hypertension    • Heartburn    • Impacted cerumen    • Prostatitis    • Special screening for malignant neoplasm of colon     • Stroke (CMS/HCC)    • Urinary tract infectious disease    • Vascular insufficiency      Past Surgical History:   Procedure Laterality Date   • VASCULAR SURGERY            OT ASSESSMENT FLOWSHEET (last 12 hours)      Occupational Therapy Evaluation     Row Name 03/20/20 0914                   OT Evaluation Time/Intention    Subjective Information  complains of;weakness  -RB        Document Type  evaluation  -RB        Mode of Treatment  individual therapy;occupational therapy  -RB        Patient Effort  adequate  -RB           General Information    Patient Profile Reviewed?  yes  -RB        Onset of Illness/Injury or Date of Surgery  03/17/20  -RB        Referring Physician  JOSSELYN Pitts MD  -RB        Patient Observations  alert;agree to therapy;poorly cooperative  -RB        Patient/Family Observations  Wife in room.  -RB        General Observations of Patient  Supine in bed with IV and room air..  -RB        Prior Level of Function  independent:;gait;transfer;mod assist:;max assist:;ADL's  -RB        Pertinent History of Current Functional Problem  Cirrhosis of liver.  -RB        Existing Precautions/Restrictions  fall  -RB        Risks Reviewed  patient:;spouse/S.O.:;LOB  -RB        Benefits Reviewed  patient:;spouse/S.O.:;improve function;increase strength;increase independence;increase balance  -RB        Barriers to Rehab  previous functional deficit;medically complex  -RB           Relationship/Environment    Primary Source of Support/Comfort  spouse  -RB        Lives With  spouse  -RB           Resource/Environmental Concerns    Current Living Arrangements  home/apartment/condo  -RB           Home Main Entrance    Number of Stairs, Main Entrance  two  -RB        Stair Railings, Main Entrance  none  -RB           Stairs Within Home, Primary    Number of Stairs, Within Home, Primary  none  -RB           Cognitive Assessment/Intervention- PT/OT    Orientation Status (Cognition)  oriented  to;person;place;time  -RB           Safety Issues, Functional Mobility    Safety Issues Affecting Function (Mobility)  safety precaution awareness;safety precautions follow-through/compliance  -RB        Impairments Affecting Function (Mobility)  cognition;endurance/activity tolerance;strength  -RB           Bed Mobility Assessment/Treatment    Bed Mobility Assessment/Treatment  scooting/bridging;supine-sit;sit-supine  -RB        Rolling Left [place] (Bed Mobility)  --  -RB        Rolling Right [place] (Bed Mobility)  --  -RB        Scooting/Bridging [place] (Bed Mobility)  dependent (less than 25% patient effort);2 person assist  -RB        Supine-Sit [place] (Bed Mobility)  moderate assist (50% patient effort);verbal cues;nonverbal cues (demo/gesture)  -RB        Sit-Supine [place] (Bed Mobility)  moderate assist (50% patient effort);verbal cues;nonverbal cues (demo/gesture)  -RB           Functional Mobility    Functional Mobility- Ind. Level  moderate assist (50% patient effort);2 person assist required  -RB        Functional Mobility- Device  rolling walker  -RB        Functional Mobility-Distance (Feet)  8  -RB        Functional Mobility- Safety Issues  balance decreased during turns;sequencing ability decreased;step length decreased;loses balance backward  -RB           Transfer Assessment/Treatment    Transfer Assessment/Treatment  sit-stand transfer;stand-sit transfer;toilet transfer  -RB           Bed-Chair Transfer    Bed-Chair [place] (Transfers)  --  -RB           Sit-Stand Transfer    Sit-Stand [place] (Transfers)  moderate assist (50% patient effort);2 person assist  -RB        Assistive Device (Sit-Stand Transfers)  walker, front-wheeled  -RB           Stand-Sit Transfer    Stand-Sit [place] (Transfers)  moderate assist (50% patient effort);2 person assist  -RB        Assistive Device (Stand-Sit Transfers)  walker, front-wheeled  -RB           Toilet Transfer     Type (Toilet Transfer)  sit-stand;stand-sit  -RB        [place] Level (Toilet Transfer)  moderate assist (50% patient effort);1 person assist;2 person assist  -RB           ADL Assessment/Intervention    BADL Assessment/Intervention  lower body dressing;toileting  -RB           Lower Body Dressing Assessment/Training    Lower Body Dressing [place] Level  lower body dressing skills;doff;don;minimum assist (75% patient effort);moderate assist (50% patient effort)  -RB        Lower Body Dressing Position  edge of bed sitting  -RB           Toileting Assessment/Training    [place] Level (Toileting)  toileting skills;perform perineal hygiene;dependent (less than 25% patient effort)  -RB        Assistive Devices (Toileting)  grab bar/safety frame;raised toilet seat  -RB        Toileting Position  supported standing;unsupported sitting  -RB           BADL Safety/Performance    Impairments, BADL Safety/Performance  balance;cognition;endurance/activity tolerance;coordination;motor planning;strength;trunk/postural control  -RB           General ROM    GENERAL ROM COMMENTS  B UE AROM was WNLs.  -RB           MMT (Manual Muscle Testing)    General MMT Comments  B UE strength was grossly 4 to 4-/5.  -RB           Sensory Assessment/Intervention    Sensory General Assessment  no sensation deficits identified to light touch  -RB           Positioning and Restraints    Pre-Treatment Position  in bed  -RB        Post Treatment Position  bed  -RB        In Bed  supine;call light within reach;exit alarm on;with family/caregiver  -RB           Pain Scale: Numbers Pre/Post-Treatment    Pain Scale: Numbers, Pretreatment  0/10 - no pain  -RB        Pain Scale: Numbers, Post-Treatment  0/10 - no pain  -RB           Plan of Care Review    Plan of Care Reviewed With  spouse  -RB           Clinical Impression (OT)    Date of Referral to OT  03/18/20  -RB        OT Diagnosis  Impaired mob and ADLs.  -RB        Functional  Level at Time of Evaluation (OT Eval)  Impaired mob and ADLs.  -RB        Criteria for Skilled Therapeutic Interventions Met (OT Eval)  yes;treatment indicated  -RB        Rehab Potential (OT Eval)  fair, will monitor progress closely  -RB        Therapy Frequency (OT Eval)  other (see comments) 5-7 days/wk.  -RB        Predicted Duration of Therapy Intervention (Therapy Eval)  Until D/C or goals met.   -RB        Care Plan Review (OT)  evaluation/treatment results reviewed;care plan/treatment goals reviewed;risks/benefits reviewed;patient/other agree to care plan  -RB        Care Plan Review, Other Participant (OT Eval)  spouse  -RB        Anticipated Discharge Disposition (OT)  home with 24/7 care;[facility] nursing facility  -RB           Vital Signs    Pre Systolic BP Rehab  115  -RB        Pre Treatment Diastolic BP  86  -RB        Post Systolic BP Rehab  108  -RB        Post Treatment Diastolic BP  78  -RB        Pretreatment Heart Rate (beats/min)  87  -RB        Posttreatment Heart Rate (beats/min)  95  -RB        Pre SpO2 (%)  95  -RB        O2 Delivery Pre Treatment  room air  -RB        Post SpO2 (%)  95  -RB        O2 Delivery Post Treatment  room air  -RB        Pre Patient Position  Supine  -RB        Intra Patient Position  Standing  -RB        Post Patient Position  Supine  -RB           Planned OT Interventions    Planned Therapy Interventions (OT Eval)  activity tolerance training;adaptive equipment training;BADL retraining;functional balance retraining;occupation/activity based interventions;patient/caregiver education/training;ROM/therapeutic exercise;strengthening exercise;transfer/mobility retraining  -RB           OT Goals    Bed Mobility Goal Selection (OT)  bed mobility, OT goal 1  -RB        Transfer Goal Selection (OT)  transfer, OT goal 1  -RB        Bathing Goal Selection (OT)  bathing, OT goal 1  -RB        Dressing Goal Selection (OT)  dressing, OT goal 1  -RB        Toileting Goal  Selection (OT)  toileting, OT goal 1  -RB        Additional Documentation  --  -RB           Bed Mobility Goal 1 (OT)    Activity/Assistive Device (Bed Mobility Goal 1, OT)  bed mobility activities, all  -RB        [place] Level/Cues Needed (Bed Mobility Goal 1, OT)  supervision required  -RB        Time Frame (Bed Mobility Goal 1, OT)  long term goal (LTG)  -RB        Progress/Outcomes (Bed Mobility Goal 1, OT)  goal not met  -RB           Transfer Goal 1 (OT)    Activity/Assistive Device (Transfer Goal 1, OT)  transfers, all  -RB        [place] Level/Cues Needed (Transfer Goal 1, OT)  contact guard assist  -RB        Time Frame (Transfer Goal 1, OT)  long term goal (LTG)  -RB        Progress/Outcome (Transfer Goal 1, OT)  goal not met  -RB           Bathing Goal 1 (OT)    Activity/Assistive Device (Bathing Goal 1, OT)  upper body bathing  -RB        [place] Level/Cues Needed (Bathing Goal 1, OT)  minimum assist (75% or more patient effort)  -RB        Time Frame (Bathing Goal 1, OT)  long term goal (LTG)  -RB        Progress/Outcomes (Bathing Goal 1, OT)  goal not met  -RB           Dressing Goal 1 (OT)    Activity/Assistive Device (Dressing Goal 1, OT)  dressing skills, all  -RB        [place]/Cues Needed (Dressing Goal 1, OT)  contact guard assist  -RB        Time Frame (Dressing Goal 1, OT)  long term goal (LTG)  -RB        Progress/Outcome (Dressing Goal 1, OT)  goal not met  -RB           Toileting Goal 1 (OT)    Activity/Device (Toileting Goal 1, OT)  toileting skills, all  -RB        [place] Level/Cues Needed (Toileting Goal 1, OT)  minimum assist (75% or more patient effort)  -RB        Time Frame (Toileting Goal 1, OT)  long term goal (LTG)  -RB        Progress/Outcome (Toileting Goal 1, OT)  goal not met  -RB           Living Environment    Home Accessibility  stairs to enter home  -RB          User Key  (r) = Recorded By, (t) = Taken By, (c) = Cosigned By    Initials Name  Effective Dates    RB William Pillai OT 07/24/19 -                OT Recommendation and Plan  Outcome Summary/Treatment Plan (OT)  Anticipated Discharge Disposition (OT): home with 24/7 care, skilled nursing facility  Planned Therapy Interventions (OT Eval): activity tolerance training, adaptive equipment training, BADL retraining, functional balance retraining, occupation/activity based interventions, patient/caregiver education/training, ROM/therapeutic exercise, strengthening exercise, transfer/mobility retraining  Therapy Frequency (OT Eval): other (see comments)(5-7 days/wk.)  Plan of Care Review  Plan of Care Reviewed With: patient, spouse  Plan of Care Reviewed With: patient, spouse  Outcome Summary: OT eval on this date.  Pt was mod A for bed mobility.  He needed mod A of 2 for sup to sit and mod A for toilet transfer with safety bar and raised toilet seat.  Mod A of 2 for ambulation to/from bathroom.  Min to mod A needed to don/doff socks.  Rec 24/7 care or skilled nursing when D/C from [facility].    Outcome Measures     Row Name 03/20/20 0914             How much help from another is currently needed...    Putting on and taking off regular lower body clothing?  2  -RB      Bathing (including washing, rinsing, and drying)  2  -RB      Toileting (which includes using toilet bed pan or urinal)  1  -RB      Putting on and taking off regular upper body clothing  2  -RB      Taking care of personal grooming (such as brushing teeth)  2  -RB      Eating meals  3  -RB      AM-PAC 6 Clicks Score (OT)  12  -RB         Functional Assessment    Outcome Measure Options  AM-PAC 6 Clicks Daily Activity (OT)  -RB        User Key  (r) = Recorded By, (t) = Taken By, (c) = Cosigned By    Initials Name Provider Type    RB William Pillai OT Occupational Therapist          Time Calculation:   Time Calculation- OT     Row Name 03/20/20 7378             Time Calculation- OT    OT Start Time  0914  -RB      OT Stop Time  1002  -RB       OT Time Calculation (min)  48 min  -RB      OT Received On  03/20/20  -RB      OT Goal Re-Cert Due Date  04/02/20  -        User Key  (r) = Recorded By, (t) = Taken By, (c) = Cosigned By    Initials Name Provider Type    William Moss OT Occupational Therapist        Therapy Charges for Today     Code Description Service Date Service Provider Modifiers Qty    [id number] HC OT EVAL MOD COMPLEXITY 3 3/20/2020 William Pillai OT GO 1               William Pillai OT  3/20/2020

## 2020-03-20 NOTE — DISCHARGE SUMMARY
DISCHARGE SUMMARY    PATIENT NAME: Jonntahan Albert       PHYSICIAN: Tyra Bloom MD  : 1945  MRN: [id number]    ADMITTED: 3/17/2020     DISCHARGED: 20    ADMISSION DIAGNOSES:  Active Hospital Problems    Diagnosis  POA   • **Cirrhosis of liver with ascites (CMS/HCC) [K74.60, R18.8]  Yes   • Coronary artery disease involving native coronary artery of native heart without angina pectoris [I25.10]  Yes   • Vitamin D deficiency [E55.9]  Yes   • Vascular dementia without behavioral disturbance (CMS/HCC) [F01.50]  Yes   • Severe malnutrition (CMS/HCC) [E43]  Yes   • Physical deconditioning [R53.81]  Yes   • Sacral wound [S31.000A]  Yes   • Chronic hepatitis C with cirrhosis (CMS/HCC) [B18.2, K74.60]  Yes   • Chronic hepatitis C virus genotype 1a infection (CMS/HCC) [B18.2]  Yes   • Acquired hypothyroidism [E03.9]  Yes   • Benign prostatic hyperplasia [N40.0]  Yes   • Gastroesophageal reflux disease [K21.9]  Yes   • Essential hypertension [I10]  Yes      Resolved Hospital Problems   No resolved problems to display.     DISCHARGE DIAGNOSES:   Active Hospital Problems    Diagnosis  POA   • **Cirrhosis of liver with ascites (CMS/HCC) [K74.60, R18.8]  Yes   • Coronary artery disease involving native coronary artery of native heart without angina pectoris [I25.10]  Yes   • Vitamin D deficiency [E55.9]  Yes   • Vascular dementia without behavioral disturbance (CMS/HCC) [F01.50]  Yes   • Severe malnutrition (CMS/HCC) [E43]  Yes   • Physical deconditioning [R53.81]  Yes   • Sacral wound [S31.000A]  Yes   • Chronic hepatitis C with cirrhosis (CMS/HCC) [B18.2, K74.60]  Yes   • Chronic hepatitis C virus genotype 1a infection (CMS/HCC) [B18.2]  Yes   • Acquired hypothyroidism [E03.9]  Yes   • Benign prostatic hyperplasia [N40.0]  Yes   • Gastroesophageal reflux disease [K21.9]  Yes   • Essential hypertension [I10]  Yes      Resolved Hospital Problems   No resolved problems to display.       SERVICE: Family  Medicine Residency  Attending: Tyler Moncada MD  Resident: Tyra Bloom MD    CONSULTS:   Consult Orders (all) (From admission, onward)     Start     Ordered    03/20/20 0702  Inpatient Case Management  Consult  Once     Provider:  (Not yet assigned)    03/20/20 0701    03/18/20 1305  Inpatient Gastroenterology Consult  Once     Specialty:  Gastroenterology  Provider:  Singh Rivera MD    03/18/20 1304    03/18/20 1148  Inpatient Gastroenterology Consult  Once     Specialty:  Gastroenterology  Provider:  Singh Rivera MD    03/18/20 1147    03/18/20 0248  Inpatient Advance Care Planning Consult  Once     Comments:  Patient is currently conserved by wife who is at bedside.  Patient wife does not have conserved and guardianship paperwork on her.  Patient eager to complete this to help with her decision-making for this patient.   Provider:  (Not yet assigned)    03/18/20 0247    03/18/20 0023  Inpatient Nutrition Consult  Once     Provider:  (Not yet assigned)    03/18/20 0031    03/18/20 0023  Inpatient Case Management  Consult  Once     Provider:  (Not yet assigned)    03/18/20 0031                PROCEDURES:   Procedure(s):  ESOPHAGOGASTRODUODENOSCOPY     HISTORY OF PRESENT ILLNESS:   HPI copied from H&P from Bruce Julien MD on 3/18/2020 at 0 247.    Jonnathan Albert is a 75 y.o. 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  Patient without fever or chills and is continues to be altered with a history of vascular dementia.  History provided by wife.  Wife is primary caregiver and guardian legally.  Patient has not undergone treatment for his hepatitis C nor serotyping or genotyping.  Patient with transient periods of lucidity.  Patient saw Dr. Rivera of GI today who recommended p.o. diuresis for this at this time.   Her completed note was not successfully written in the EMR at this time.  Patient also with recently healed sacral ulcer as he has been bedbound since November.  Patient been doing castor oil for this.  Patient's wife denies melena hematochezia chest discomfort fever and chills or noticeable peripheral edema to her.    DIAGNOSTIC DATA:   Lab Results (last 24 hours)     Procedure Component Value Units Date/Time    Comprehensive Metabolic Panel [[id number]]  (Abnormal) Collected:  03/20/20 0621    Specimen:  Blood Updated:  03/20/20 0652     Glucose 75 mg/dL      BUN 18 mg/dL      Creatinine 1.10 mg/dL      Sodium 139 mmol/L      Potassium 3.6 mmol/L      Chloride 107 mmol/L      CO2 21.0 mmol/L      Calcium 7.7 mg/dL      Total Protein 6.3 g/dL      Albumin 2.40 g/dL      ALT (SGPT) 7 U/L      AST (SGOT) 21 U/L      Alkaline Phosphatase 36 U/L      Total Bilirubin 0.3 mg/dL      eGFR  African Amer 79 mL/min/1.73      Globulin 3.9 gm/dL      A/G Ratio 0.6 g/dL      BUN/Creatinine Ratio 16.4     Anion Gap 11.0 mmol/L     Narrative:       GFR Normal >60  Chronic Kidney Disease <60  Kidney Failure <15      CBC Auto Differential [[id number]]  (Abnormal) Collected:  03/20/20 0621    Specimen:  Blood Updated:  03/20/20 0650     WBC 1.87 10*3/mm3      RBC 2.83 10*6/mm3      Hemoglobin 9.1 g/dL      Hematocrit 25.9 %      MCV 91.5 fL      MCH 32.2 pg      MCHC 35.1 g/dL      RDW 15.3 %      RDW-SD 50.7 fl      MPV 11.7 fL      Platelets 84 10*3/mm3      Neutrophil % 50.4 %      Lymphocyte % 35.8 %      Monocyte % 11.2 %      Eosinophil % 2.1 %      Basophil % 0.0 %      Immature Grans % 0.5 %      Neutrophils, Absolute 0.94 10*3/mm3      Lymphocytes, Absolute 0.67 10*3/mm3      Monocytes, Absolute 0.21 10*3/mm3      Eosinophils, Absolute 0.04 10*3/mm3      Basophils, Absolute 0.00 10*3/mm3      Immature Grans, Absolute 0.01 10*3/mm3      nRBC 0.0 /100 WBC     Vitamin B12 [[id number]]  (Normal) Collected:  03/19/20 0624     Specimen:  Blood Updated:  03/19/20 1222     Vitamin B-12 412 pg/mL     Narrative:       Results may be falsely increased if patient taking Biotin.      Folate [[id number]]  (Normal) Collected:  03/19/20 0624    Specimen:  Blood Updated:  03/19/20 1222     Folate >20.00 ng/mL     Narrative:       Results may be falsely increased if patient taking Biotin.      Body Fluid Culture - Body Fluid, Peritoneum [[id number]] Collected:  03/18/20 1048    Specimen:  Body Fluid from Peritoneum Updated:  03/19/20 1100     Body Fluid Culture No growth at 24 hours     Gram Stain Rare (1+) WBCs seen      No organisms seen            HOSPITAL COURSE (by problem):    1. Cirrhosis of the liver with ascites  Patient has a history of hepatitis C since 2015.  Patient presented with increased abdominal girth and continued altered mental status with urinary and fecal incontinence.  Gastroenterology was consulted, Dr. Rivera was following the patient.  An ultrasound was ordered to perform paracentesis, fluid was collected for cultures that resulted showed no growth at 2 days, no organisms, just a few white blood cells.  SAAG is calculated to be 0.9 g/dL, showing ascites is not due to portal hypertension.  Patient was put on Lasix 40 mg daily, and Aldactone 25 mg daily was added, strict I's and O's were monitored, he was fed a regular diet with 1.5 L fluid restriction, and was also recommended to be on a high-fiber and no salt added diet.  His ammonia levels were fairly normal.  Patient's wife is a primary caregiver and decision maker.  Dr. Rivera had recommended an EGD to diagnose esophageal varices, patient's wife had declined this several times, up until the day of discharge.  It was decided the patient could go home and was stable, and the EGD would be done outpatient.  Patient was instructed to follow-up with her PCP 1 to 2 days after discharge.  Schedule the EGD for Monday after discharge.  And Dr. Rivera will follow outpatient.    2.   Chronic hepatitis C virus genotype 1a infection  Chronic problem since 2015.  Dr. Rivera following, she had ordered an AFP tumor marker as well as a viral copies for hepatitis C.    3.  Essential hypertension  Vital signs were monitored every 4 hours, patient remained normotensive throughout his hospital stay.  Home medications were continued that included amlodipine 5 mg daily, his Lasix 40 mg daily, ramipril 5 mg daily.  Patient remained stable and control from the standpoint.  Follow-up with your PCP 1 to 2 days after discharge.    4.  GERD  Patient also remained stable from the standpoint, p.o. intake was well tolerated.  Denied any nausea or vomiting.  His home medication famotidine 40 mg daily was continued.    5.  Benign prostatic hyperplasia  Patient was continued on his home medication tamsulosin 0.8 mg nightly.  Patient had no difficulty voiding, no signs of dysuria.    6.  Acquired hypothyroidism  Patient was stable from the standpoint as well, levothyroxine 50 mcg every morning was continued.    7.  Coronary artery disease without angina pectoris  Patient was stable and did not exhibit any chest pain.  His home medications were continued that included aspirin 81 mg, his clopidogrel 75 mg was held, and he was still continued on his ramipril 5 mg daily.  Patient was stable and asymptomatic during his hospital course.    8.  Vitamin D deficiency  Patient was continued on his cholecalciferol 2000 units daily.    9.  Vascular dementia without behavioral disturbance  Patient's baseline is oriented to person and place, has difficulty with time.  Patient is also extremely hard of hearing and difficult to converse with.  Relies on his wife is his primary caregiver.  Patient is protected by guardianship from his wife for all legal decisions.  His home medication memantine 5 mg twice daily was continued.    10.  Severe malnutrition  BMI is 22.82, he has prominence of his clavicles and depressed and sunken skin in  his acromioclavicular spaces.  His temples bilaterally are also sunken.  Nutrition was consulted for nutrition severity index.  Patient also has hypoalbuminemia related to chronic decompensated liver disease.  Despite this the patient had a good appetite and tolerated p.o. intake very well.    11.  Physical deconditioning  Patient has been bedbound due to multiple TIAs in the past, since November 2019.  He has minimal ambulation to the bathroom and around the house.  Up with assistance if anything.  Placement was recommended by Dr. Rivera, due to the increased care burden, but his wife denied and decided this was not appropriate.  She preferred home health to help transition.  PT and OT were consulted to evaluate and treat.  Social work was also consulted.  Patient was discharged with home health skilled nursing, PT, and OT.    12.  Sacral wound  Patient had a rough keratotic skin over the sacral process, in the stage of healing.  It does not qualify as a stage I sacral ulcer but the patient would benefit from barrier protection.  Wound care was consulted and provided care twice daily.    Incidental findings on imaging or diagnostic tests: None    DISCHARGE CONDITION:   Stable    DISPOSITION:  disposition: Home w/ HH    DISCHARGE MEDICATIONS     Discharge Medications      New Medications      Instructions Start Date   furosemide 40 MG tablet  Commonly known as:  LASIX   40 mg, Oral, Daily   Start Date:  March 21, 2020     ondansetron 4 MG tablet  Commonly known as:  ZOFRAN   4 mg, Oral, Every 6 Hours PRN      spironolactone 25 MG tablet  Commonly known as:  Aldactone   25 mg, Oral, Daily         Changes to Medications      Instructions Start Date   levothyroxine 50 MCG tablet  Commonly known as:  Synthroid  What changed:  how much to take   50 mcg, Oral, Daily         Continue These Medications      Instructions Start Date   amLODIPine 5 MG tablet  Commonly known as:  NORVASC   5 mg, Oral, Daily      aspirin 81 MG  chewable tablet   81 mg, Oral, Daily      clopidogrel 75 MG tablet  Commonly known as:  PLAVIX   75 mg, Oral, Daily      Commode Bedside misc   Bedside commode to be used as needed due to difficulty with ambulation and mobility.      famotidine 40 MG tablet  Commonly known as:  Pepcid   40 mg, Oral, Daily      memantine 5 MG tablet  Commonly known as:  Namenda   5 mg, Oral, 2 Times Daily      miconazole 2 % powder  Commonly known as:  Lotrimin AF   Topical, As Needed      MILK THISTLE PO   Oral      ramipril 5 MG capsule  Commonly known as:  ALTACE   TAKE 1 CAPSULE DAILY      tamsulosin 0.4 MG capsule 24 hr capsule  Commonly known as:  FLOMAX   0.8 mg, Oral, Nightly      TRACE MIN CACRCUFEKMGMNPSEZN PO   1 tablet, Oral, Daily      vitamin D 1.25 MG (99616 UT) capsule capsule  Commonly known as:  ERGOCALCIFEROL   50,000 Units, Oral, Every 7 Days             INSTRUCTIONS:  Activity:   Activity Instructions     Up WIth Assist           Diet:   Diet Instructions     Advance Diet As Tolerated      High-fiber diet.  Low-salt or no salt added.          FOLLOW UP:   Additional Instructions for the Follow-ups that You Need to Schedule     Discharge Follow-up with PCP   As directed       Currently Documented PCP:    Maxi Morales MD    PCP Phone Number:    [phone]     Follow Up Details:  1 to 2 days after discharge         Discharge Follow-up with Specified Provider: Dr. Chaim Sin; 1 Week   As directed      To:  Dr. Chaim Sin    Follow Up:  1 Week    Follow Up Details:  Management of liver failure         Referral to Home Health   As directed      Face to Face Visit Date:  3/20/2020    Follow-up provider for Plan of Care?:  I treated the patient in an acute care facility and will not continue treatment after discharge.    Follow-up provider:  MAXI MORALES [[id number]]    Reason/Clinical Findings:  Deconditioning    Describe mobility limitations that make leaving home difficult:  Deconditioning       Nursing/Therapeutic Services Requested:  Skilled Nursing Physical Therapy Occupational Therapy    Skilled nursing orders:  Mental health    PT orders:  Home safety assessment Strengthening    Occupational orders:  Activities of daily living Strengthening Home safety assessment    Frequency:  1 Week 1         Basic metabolic panel    Mar 25, 2020 (Approximate)        Follow-up Information     Maxi Morales MD Follow up.    Specialty:  Family Medicine  Why:  Office will call with appointment date and time.  Contact information:  [address]  [facility] [address] [phone]             Maxi Morales MD .    Specialty:  Family Medicine  Why:  1 to 2 days after discharge  Contact information:  [address]  [facility] [id number] [phone]             [facility] HOME CARE REFERRAL .    Specialty:  Home Health Services  Contact information:  [address]  [place] [address]               Please follow-up with Maxi Morales MD1 to 2 days after hospital discharge.      PENDING TEST RESULTS AT DISCHARGE   Order Current Status    Anaerobic Culture - Body Fluid, Peritoneum In process    Methylmalonic Acid, Serum In process    Body Fluid Culture - Body Fluid, Peritoneum Preliminary result          Time: >30 minutes was spent in discharge planning, medication reconciliation and coordination of care for this patient.    Tyler Moncada MD is the attending at time of discharge, He is aware of the patient's status and agrees with the above discharge summary.        This document has been electronically signed by Tyra Bloom MD on March 20, 2020 09:27   Portions of this note were dictated using Dragon.

## 2020-03-20 NOTE — PLAN OF CARE
Problem: Patient Care Overview  Goal: Plan of Care Review  Outcome: Ongoing (interventions implemented as appropriate)  Flowsheets  Taken 3/20/2020 0127  Progress: no change  Outcome Summary: Pt appears to be resting well at this time; wife at bedside; will continue to monitor.  Taken 3/19/2020 2045  Plan of Care Reviewed With: patient;spouse

## 2020-03-20 NOTE — SIGNIFICANT NOTE
03/20/20 1540   Rehab Treatment   Discipline physical therapy assistant   Reason Treatment Not Performed unavailable for treatment  (Pt checked on x 2 attempts this date and not available for tx. Will check back again.)

## 2020-03-20 NOTE — PROGRESS NOTES
FAMILY MEDICINE DAILY PROGRESS NOTE    NAME: Jonnathan Albert  : 1945  MRN: [id number]      LOS: 2 days     PROVIDER OF SERVICE: Tyra Bloom MD    Chief Complaint: Cirrhosis of liver with ascites (CMS/HCC)    Subjective:     Patient seen at: 0820  Interval History: family    No acute events overnight.  Wife was in the room.  Patient is extremely hard of hearing.  Wife has decided not to do an EGD, states that the patient had one in the past and had an esophageal perforation.  She states that she had difficulty coming to [place] from [place].  She would prefer to have a GI specialist manage his care in [place].  She would rather go home, with home health,    Review of Systems:   Review of Systems   Unable to perform ROS: Dementia       Objective:     Vital Signs  Temp:  [97.6 °F (36.4 °C)-98.6 °F (37 °C)] 98.1 °F (36.7 °C)  Heart Rate:  [67-89] 88  Resp:  [18-20] 18  BP: (122-152)/(68-91) 137/87  Body mass index is 20.77 kg/m².    Physical Exam  Physical Exam   Constitutional: He appears cachectic. He has a sickly appearance.   HENT:   Head: Atraumatic.   Right Ear: Decreased hearing is noted.   Left Ear: Decreased hearing is noted.   Nose: Nose normal.   Mouth/Throat: Mucous membranes are normal.   Eyes: Pupils are equal, round, and reactive to light. Conjunctivae and lids are normal.   Neck: Neck supple.   Cardiovascular: Normal rate, regular rhythm and normal heart sounds.   Pulmonary/Chest: Effort normal and breath sounds normal.   Abdominal: Soft. Bowel sounds are normal. There is no tenderness.   Neurological: He is alert. He is disoriented.   Skin: Skin is warm.   Psychiatric: His affect is blunt. His speech is delayed. He is slowed. He exhibits abnormal recent memory.   Vitals reviewed.      Medication Review    Current Facility-Administered Medications:   •  amLODIPine (NORVASC) tablet 5 mg, 5 mg, Oral, Daily, Bruce Julien III, MD, 5 mg at 20 0845  •  aspirin  chewable tablet 81 mg, 81 mg, Oral, Daily, Bruce Julien III, MD, 81 mg at 03/19/20 0845  •  cefTRIAXone (ROCEPHIN) 2 g/100 mL 0.9% NS VTB ([place]), 2 g, Intravenous, Q24H, Steffany Child MD, 2 g at 03/19/20 1750  •  cholecalciferol (VITAMIN D3) tablet 2,000 Units, 2,000 Units, Oral, Daily, Bruce Julien III, MD, 2,000 Units at 03/19/20 0844  •  famotidine (PEPCID) tablet 40 mg, 40 mg, Oral, Daily, Bruce Julien III, MD, 40 mg at 03/19/20 0845  •  ferrous sulfate EC tablet 324 mg, 324 mg, Oral, Daily With Breakfast, Steffany Child MD, 324 mg at 03/19/20 1247  •  furosemide (LASIX) tablet 40 mg, 40 mg, Oral, Daily, Bruce Julien III, MD, 40 mg at 03/19/20 0844  •  levothyroxine (SYNTHROID, LEVOTHROID) tablet 50 mcg, 50 mcg, Oral, Q AM, Steffayn Child MD, 50 mcg at 03/20/20 0626  •  memantine (NAMENDA) tablet 5 mg, 5 mg, Oral, BID, Bruce Julien III, MD, 5 mg at 03/19/20 2045  •  morphine injection 2 mg, 2 mg, Intravenous, Q4H PRN, Steffany Child MD  •  ondansetron (ZOFRAN) tablet 4 mg, 4 mg, Oral, Q6H PRN **OR** ondansetron (ZOFRAN) injection 4 mg, 4 mg, Intravenous, Q6H PRN, Bruce Julien III, MD  •  ramipril (ALTACE) capsule 5 mg, 5 mg, Oral, Daily, Bruce Julien III, MD, 5 mg at 03/19/20 0844  •  [COMPLETED] Insert peripheral IV, , , Once **AND** sodium chloride 0.9 % flush 10 mL, 10 mL, Intravenous, PRN, O'Krish, Bruce S III, MD  •  sodium chloride 0.9 % flush 10 mL, 10 mL, Intravenous, Q12H, Bruce Julien III, MD, 10 mL at 03/19/20 2045  •  sodium chloride 0.9 % flush 10 mL, 10 mL, Intravenous, PRN, Bruce Julien III, MD  •  tamsulosin (FLOMAX) 24 hr capsule 0.8 mg, 0.8 mg, Oral, Nightly, Bruce Julien III, MD, 0.8 mg at 03/19/20 2045     Diagnostic Data    Lab Results (last 24 hours)     Procedure Component Value Units Date/Time    Comprehensive Metabolic Panel [[id number]]  (Abnormal) Collected:  03/20/20 0621    Specimen:  Blood  Updated:  03/20/20 0652     Glucose 75 mg/dL      BUN 18 mg/dL      Creatinine 1.10 mg/dL      Sodium 139 mmol/L      Potassium 3.6 mmol/L      Chloride 107 mmol/L      CO2 21.0 mmol/L      Calcium 7.7 mg/dL      Total Protein 6.3 g/dL      Albumin 2.40 g/dL      ALT (SGPT) 7 U/L      AST (SGOT) 21 U/L      Alkaline Phosphatase 36 U/L      Total Bilirubin 0.3 mg/dL      eGFR  African Amer 79 mL/min/1.73      Globulin 3.9 gm/dL      A/G Ratio 0.6 g/dL      BUN/Creatinine Ratio 16.4     Anion Gap 11.0 mmol/L     Narrative:       GFR Normal >60  Chronic Kidney Disease <60  Kidney Failure <15      CBC Auto Differential [[id number]]  (Abnormal) Collected:  03/20/20 0621    Specimen:  Blood Updated:  03/20/20 0650     WBC 1.87 10*3/mm3      RBC 2.83 10*6/mm3      Hemoglobin 9.1 g/dL      Hematocrit 25.9 %      MCV 91.5 fL      MCH 32.2 pg      MCHC 35.1 g/dL      RDW 15.3 %      RDW-SD 50.7 fl      MPV 11.7 fL      Platelets 84 10*3/mm3      Neutrophil % 50.4 %      Lymphocyte % 35.8 %      Monocyte % 11.2 %      Eosinophil % 2.1 %      Basophil % 0.0 %      Immature Grans % 0.5 %      Neutrophils, Absolute 0.94 10*3/mm3      Lymphocytes, Absolute 0.67 10*3/mm3      Monocytes, Absolute 0.21 10*3/mm3      Eosinophils, Absolute 0.04 10*3/mm3      Basophils, Absolute 0.00 10*3/mm3      Immature Grans, Absolute 0.01 10*3/mm3      nRBC 0.0 /100 WBC     Vitamin B12 [[id number]]  (Normal) Collected:  03/19/20 0624    Specimen:  Blood Updated:  03/19/20 1222     Vitamin B-12 412 pg/mL     Narrative:       Results may be falsely increased if patient taking Biotin.      Folate [[id number]]  (Normal) Collected:  03/19/20 0624    Specimen:  Blood Updated:  03/19/20 1222     Folate >20.00 ng/mL     Narrative:       Results may be falsely increased if patient taking Biotin.      Body Fluid Culture - Body Fluid, Peritoneum [[id number]] Collected:  03/18/20 1048    Specimen:  Body Fluid from Peritoneum Updated:  03/19/20 1100     Body Fluid  Culture No growth at 24 hours     Gram Stain Rare (1+) WBCs seen      No organisms seen    Comprehensive Metabolic Panel [[id number]]  (Abnormal) Collected:  03/19/20 0624    Specimen:  Blood Updated:  03/19/20 0724     Glucose 110 mg/dL      BUN 15 mg/dL      Creatinine 0.91 mg/dL      Sodium 141 mmol/L      Potassium 3.5 mmol/L      Chloride 109 mmol/L      CO2 21.0 mmol/L      Calcium 7.9 mg/dL      Total Protein 7.0 g/dL      Albumin 2.90 g/dL      ALT (SGPT) 8 U/L      AST (SGOT) 20 U/L      Alkaline Phosphatase 39 U/L      Total Bilirubin 0.4 mg/dL      eGFR  African Amer 98 mL/min/1.73      Globulin 4.1 gm/dL      A/G Ratio 0.7 g/dL      BUN/Creatinine Ratio 16.5     Anion Gap 11.0 mmol/L     Narrative:       GFR Normal >60  Chronic Kidney Disease <60  Kidney Failure <15      Iron Profile [[id number]]  (Abnormal) Collected:  03/19/20 0624    Specimen:  Blood Updated:  03/19/20 0724     Iron 46 mcg/dL      Iron Saturation 26 %      Transferrin 118 mg/dL      TIBC 176 mcg/dL     Ferritin [[id number]]  (Normal) Collected:  03/19/20 0624    Specimen:  Blood Updated:  03/19/20 0722     Ferritin 233.60 ng/mL     Narrative:       Results may be falsely decreased if patient taking Biotin.              I reviewed the patient's new clinical results.  Discussed with Dr. Child    Assessment/Plan:     Active Hospital Problems    Diagnosis POA   • **Cirrhosis of liver with ascites (CMS/HCC) [K74.60, R18.8] Yes     Patient with chronic liver disease related to hepatitis C.     Patient seen gastroenterology Dr. Rivera today.    - Ultrasound by interventional radiology for paracentesis today  -Body fluid cell count amylase lipase and albumin to calculate SAAG score.  -Furosemide 40 mg p.o. daily  -Daily standing weights  -Strict I's and O's  - Regular diet with 1.5 L fluid restriction  -Ammonia within normal limits     • Coronary artery disease involving native coronary artery of native heart without angina pectoris [I25.10]  Yes     We will continue most of his home meds holding dual antiplatelet therapy with paracentesis planned.  -Aspirin 81 mg p.o. every morning  -Holding clopidogrel 75 mg p.o. daily holding  - Ramipril 5 mg p.o. daily     • Vitamin D deficiency [E55.9] Yes     Resume home medications.  -Cholecalciferol 2000 units p.o. daily     • Vascular dementia without behavioral disturbance (CMS/HCC) [F01.50] Yes     Patient with altered mental status only acute alert and oriented x1.  Patient is currently conserved by guardianship from his wife for legal decisions.  We will continue home medication to help phyllis further memory loss.  -Memantine 5 mg p.o. twice daily     • Severe malnutrition (CMS/HCC) [E43] Yes     Body mass index is 22.82 kg/m².  Despite this patient has prominence of clavicles and depressed sunken in skin in his acromioclavicular spaces.  -Nutrition consult for malnutrition severity index  -Patient with hypoalbuminemia likely related to chronic decompensated liver disease       • Physical deconditioning [R53.81] Yes     Patient has been bedbound with multiple TIAs.  Bedbound status began in November 2019.  Patient with minimal ambulation to bathroom in or out of home.  Possible need for placement with wife being sole caregiver.  -PT and OT evaluate and treat  -Social work consult     • Sacral wound [S31.000A] Yes     Patient with rough keratotic skin over sacral process.  This is in stage of healing.  This is not qualified stage I ulcer, however would benefit from barrier protection.  -Wound care twice daily     • Chronic hepatitis C with cirrhosis (CMS/HCC) [B18.2, K74.60] Yes   • Chronic hepatitis C virus genotype 1a infection (CMS/HCC) [B18.2] Yes   • Acquired hypothyroidism [E03.9] Yes     -Levothyroxine 50 mcg p.o. every morning     • Benign prostatic hyperplasia [N40.0] Yes     We will continue home alpha blockade.  -Tamsulosin 0.8 mg p.o. nightly     • Gastroesophageal reflux disease [K21.9] Yes     We  will resume patient's home H2 blockade.  -Famotidine 40 mg p.o. daily     • Essential hypertension [I10] Yes     We will continue home medications and monitor blood pressure related to fluid shifts around paracentesis.  -Amlodipine 5 mg p.o. daily  - Furosemide 40 mg p.o. daily  -Ramipril 5 mg p.o. daily  -Vital signs per unit protocol         DVT prophylaxis:   Mechanical Order History:      Ordered        03/18/20 1253  Place Sequential Compression Device  Once         03/18/20 1253  Maintain Sequential Compression Device  Continuous                 Pharmalogical Order History:     Ordered     Dose Route Frequency Stop    03/18/20 0031  enoxaparin (LOVENOX) syringe 40 mg  Status:  Discontinued      40 mg SC Every 24 Hours 03/18/20 1222         Code status is   Code Status and Medical Interventions:   Ordered at: 03/18/20 0031     Level Of Support Discussed With:    Health Care Surrogate    Next of Kin (If No Surrogate)     Code Status:    CPR     Medical Interventions (Level of Support Prior to Arrest):    Full     Comments:    Pt is conserved by wife       Plan for disposition: Where: home and home health and When:  today      Time: 10 minutes        This document has been electronically signed by Tyra Bloom MD on March 20, 2020 07:10   Portions of this note were dictated using Dragon.

## 2020-03-20 NOTE — SIGNIFICANT NOTE
03/20/20 1300   Rehab Treatment   Reason Treatment Not Performed unavailable for treatment  (Pt with nsg at this time. Will check back.)

## 2020-03-20 NOTE — PROGRESS NOTES
SUBJECTIVE:   3/20/2020  Chief Complaint:     Subjective      Patient feels better today.  Awake, alert and verbalizing.  No GI complaints at this time.  Patient's wife at bedside.  She refused for him to have EGD today earlier this morning.  Patient has consumed breakfast.  She stated she changed her mind and is requesting endoscopy to be performed during the visit to avoid multiple trips to the hospital given the difficulty with transportation.    History:  Past Medical History:   Diagnosis Date   • Anxiety state    • Chronic hepatitis C (CMS/HCC)    • Dementia (CMS/HCC)    • Depressive disorder    • Encounter for screening for malignant neoplasm of prostate    • Epigastric pain    • Essential hypertension    • Heartburn    • Impacted cerumen    • Prostatitis    • Special screening for malignant neoplasm of colon    • Stroke (CMS/HCC)    • Urinary tract infectious disease    • Vascular insufficiency      Past Surgical History:   Procedure Laterality Date   • VASCULAR SURGERY       Family History   Problem Relation Age of Onset   • Dementia Other    • Diabetes Other    • Heart disease Other    • Hypertension Other    • Alcohol abuse Other    • Lung cancer Other    • Rheum arthritis Other    • Stroke Other      Social History     Tobacco Use   • Smoking status: Former Smoker   • Smokeless tobacco: Never Used   Substance Use Topics   • Alcohol use: Not Currently   • Drug use: Not Currently     Types: Heroin     Medications Prior to Admission   Medication Sig Dispense Refill Last Dose   • amLODIPine (NORVASC) 5 MG tablet Take 1 tablet by mouth Daily. 90 tablet 1 Taking   • aspirin 81 MG chewable tablet Chew 1 tablet Daily. 90 tablet 1 Taking   • clopidogrel (PLAVIX) 75 MG tablet Take 1 tablet by mouth Daily. 90 tablet 1 Taking   • famotidine (PEPCID) 40 MG tablet Take 1 tablet by mouth Daily. 90 tablet 3 Taking   • memantine (NAMENDA) 5 MG tablet Take 1 tablet by mouth 2 (Two) Times a Day. 180 tablet 1 Taking     • MILK THISTLE PO Take  by mouth.   Taking   • Misc. Devices (COMMODE BEDSIDE) misc Bedside commode to be used as needed due to difficulty with ambulation and mobility. 1 each 0 Taking   • ramipril (ALTACE) 5 MG capsule TAKE 1 CAPSULE DAILY 90 capsule 1 Taking   • tamsulosin (FLOMAX) 0.4 MG capsule 24 hr capsule Take 2 capsules by mouth Every Night. 180 capsule 1 Taking   • levothyroxine (SYNTHROID) 50 MCG tablet Take 1 tablet by mouth Daily. (Patient taking differently: Take 25 mcg by mouth Daily.) 30 tablet 3 Taking   • miconazole (LOTRIMIN AF) 2 % powder Apply  topically to the appropriate area as directed As Needed for Itching. 85 g 5 Taking   • TRACE MIN CACRCUFEKMGMNPSEZN PO Take 1 tablet by mouth Daily.   Taking   • vitamin D (ERGOCALCIFEROL) 1.25 MG (87663 UT) capsule capsule Take 1 capsule by mouth Every 7 (Seven) Days. 4 capsule 3 Taking     Allergies:  Patient has no known allergies.     CURRENT MEDICATIONS/OBJECTIVE/VS/PE:     Current Medications:     Current Facility-Administered Medications   Medication Dose Route Frequency Provider Last Rate Last Dose   • amLODIPine (NORVASC) tablet 5 mg  5 mg Oral Daily Bruce Julien III, MD   5 mg at 03/20/20 0807   • aspirin chewable tablet 81 mg  81 mg Oral Daily Bruce Julien III, MD   81 mg at 03/20/20 0806   • cholecalciferol (VITAMIN D3) tablet 2,000 Units  2,000 Units Oral Daily Bruce Julien III, MD   2,000 Units at 03/20/20 0806   • famotidine (PEPCID) tablet 40 mg  40 mg Oral Daily Bruce Julien III, MD   40 mg at 03/20/20 0809   • ferrous sulfate EC tablet 324 mg  324 mg Oral Daily With Breakfast Steffayn Child MD   324 mg at 03/20/20 0807   • furosemide (LASIX) tablet 40 mg  40 mg Oral Daily Bruce Julien III, MD   40 mg at 03/20/20 0807   • levothyroxine (SYNTHROID, LEVOTHROID) tablet 50 mcg  50 mcg Oral Q AM Steffany Child MD   50 mcg at 03/20/20 0626   • memantine (NAMENDA) tablet 5 mg  5 mg Oral BID O'Krish,  Bruce MCCARTY III, MD   5 mg at 03/20/20 0807   • morphine injection 2 mg  2 mg Intravenous Q4H PRN Steffany Child MD       • ondansetron (ZOFRAN) tablet 4 mg  4 mg Oral Q6H PRN Bruce Julien III, MD        Or   • ondansetron (ZOFRAN) injection 4 mg  4 mg Intravenous Q6H PRN Bruce Julien III, MD       • ramipril (ALTACE) capsule 5 mg  5 mg Oral Daily MANUEL'Bruce Rutherford III, MD   5 mg at 03/20/20 0807   • sodium chloride 0.9 % flush 10 mL  10 mL Intravenous PRN Bruce Julien III, MD       • sodium chloride 0.9 % flush 10 mL  10 mL Intravenous Q12H Bruce Julien III, MD   10 mL at 03/19/20 2045   • sodium chloride 0.9 % flush 10 mL  10 mL Intravenous PRN Bruce Julien III, MD       • tamsulosin (FLOMAX) 24 hr capsule 0.8 mg  0.8 mg Oral Nightly Bruce Julien III, MD   0.8 mg at 03/19/20 2045       Objective     Review of Systems:   Review of Systems   Constitutional: Negative for chills, fatigue, fever and unexpected weight change.   HENT: Negative for congestion, ear discharge, hearing loss, nosebleeds and sore throat.    Eyes: Negative for pain, discharge and redness.   Respiratory: Negative for cough, chest tightness, shortness of breath and wheezing.    Cardiovascular: Negative for chest pain and palpitations.   Gastrointestinal: Negative for abdominal distention, abdominal pain, blood in stool, constipation, diarrhea, nausea and vomiting.   Endocrine: Negative for cold intolerance, polydipsia, polyphagia and polyuria.   Genitourinary: Negative for dysuria, flank pain, frequency, hematuria and urgency.   Musculoskeletal: Negative for arthralgias, back pain, joint swelling and myalgias.   Skin: Negative for color change, pallor and rash.   Neurological: Negative for tremors, seizures, syncope, weakness and headaches.   Hematological: Negative for adenopathy. Does not bruise/bleed easily.   Psychiatric/Behavioral: Negative for behavioral problems, confusion, dysphoric mood,  hallucinations and suicidal ideas. The patient is not nervous/anxious.        Physical Exam:   Temp:  [97 °F (36.1 °C)-99 °F (37.2 °C)] 97 °F (36.1 °C)  Heart Rate:  [67-94] 80  Resp:  [18-20] 18  BP: (107-152)/(67-91) 107/67     Physical Exam:  General Appearance:    Alert, cooperative, in no acute distress   Head:    Normocephalic, without obvious abnormality, atraumatic   Eyes:            Lids and lashes normal, conjunctivae and sclerae normal, no   icterus, no pallor, corneas clear, PERRLA   Ears:    Ears appear intact with no abnormalities noted   Throat:   No oral lesions, no thrush, oral mucosa moist   Neck:   No adenopathy, supple, trachea midline, no thyromegaly, no     carotid bruit, no JVD   Back:     No kyphosis present, no scoliosis present, no skin lesions,       erythema or scars, no tenderness to percussion or                   palpation,   range of motion normal   Lungs:     Clear to auscultation,respirations regular, even and                   unlabored    Heart:    Regular rhythm and normal rate, normal S1 and S2, no            murmur, no gallop, no rub, no click   Breast Exam:    Deferred   Abdomen:     Normal bowel sounds, no masses, no organomegaly, soft        non-tender, non-distended, no guarding, no rebound                 tenderness   Genitalia:    Deferred   Extremities:   Moves all extremities well, no edema, no cyanosis, no              redness   Pulses:   Pulses palpable and equal bilaterally   Skin:   No bleeding, bruising or rash   Lymph nodes:   No palpable adenopathy   Neurologic:   Cranial nerves 2 - 12 grossly intact, sensation intact, DTR        present and equal bilaterally      Results Review:     Lab Results (last 24 hours)     Procedure Component Value Units Date/Time    Body Fluid Culture - Body Fluid, Peritoneum [[id number]] Collected:  03/18/20 1048    Specimen:  Body Fluid from Peritoneum Updated:  03/20/20 1100     Body Fluid Culture No growth at 2 days     Gram Stain  Rare (1+) WBCs seen      No organisms seen    Comprehensive Metabolic Panel [[id number]]  (Abnormal) Collected:  03/20/20 0621    Specimen:  Blood Updated:  03/20/20 0652     Glucose 75 mg/dL      BUN 18 mg/dL      Creatinine 1.10 mg/dL      Sodium 139 mmol/L      Potassium 3.6 mmol/L      Chloride 107 mmol/L      CO2 21.0 mmol/L      Calcium 7.7 mg/dL      Total Protein 6.3 g/dL      Albumin 2.40 g/dL      ALT (SGPT) 7 U/L      AST (SGOT) 21 U/L      Alkaline Phosphatase 36 U/L      Total Bilirubin 0.3 mg/dL      eGFR  African Amer 79 mL/min/1.73      Globulin 3.9 gm/dL      A/G Ratio 0.6 g/dL      BUN/Creatinine Ratio 16.4     Anion Gap 11.0 mmol/L     Narrative:       GFR Normal >60  Chronic Kidney Disease <60  Kidney Failure <15      CBC Auto Differential [[id number]]  (Abnormal) Collected:  03/20/20 0621    Specimen:  Blood Updated:  03/20/20 0650     WBC 1.87 10*3/mm3      RBC 2.83 10*6/mm3      Hemoglobin 9.1 g/dL      Hematocrit 25.9 %      MCV 91.5 fL      MCH 32.2 pg      MCHC 35.1 g/dL      RDW 15.3 %      RDW-SD 50.7 fl      MPV 11.7 fL      Platelets 84 10*3/mm3      Neutrophil % 50.4 %      Lymphocyte % 35.8 %      Monocyte % 11.2 %      Eosinophil % 2.1 %      Basophil % 0.0 %      Immature Grans % 0.5 %      Neutrophils, Absolute 0.94 10*3/mm3      Lymphocytes, Absolute 0.67 10*3/mm3      Monocytes, Absolute 0.21 10*3/mm3      Eosinophils, Absolute 0.04 10*3/mm3      Basophils, Absolute 0.00 10*3/mm3      Immature Grans, Absolute 0.01 10*3/mm3      nRBC 0.0 /100 WBC     Vitamin B12 [[id number]]  (Normal) Collected:  03/19/20 0624    Specimen:  Blood Updated:  03/19/20 1222     Vitamin B-12 412 pg/mL     Narrative:       Results may be falsely increased if patient taking Biotin.      Folate [[id number]]  (Normal) Collected:  03/19/20 0624    Specimen:  Blood Updated:  03/19/20 1222     Folate >20.00 ng/mL     Narrative:       Results may be falsely increased if patient taking Biotin.             I  reviewed the patient's new clinical results.  I reviewed the patient's new imaging results and agree with the interpretation.     ASSESSMENT/PLAN:   ASSESSMENT: 1.  Ascites, status post large-volume paracentesis.  2.  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  Dementia  4.  Urinary and fecal incontinence, likely due to dementia and previous CVA  5.  Variceal screening  PLAN: No added salt diet.  Continue Lasix and Aldactone  EGD as outpatient for variceal screening  Recommend placement  The risks, benefits, and alternatives of this procedure have been discussed with the patient or the responsible party- the patient understands and agrees to proceed.         Singh Rivera MD  03/20/20  11:46

## 2020-03-20 NOTE — PLAN OF CARE
Problem: Patient Care Overview  Goal: Plan of Care Review  Outcome: Ongoing (interventions implemented as appropriate)  Flowsheets (Taken 3/20/2020 7787)  Plan of Care Reviewed With: patient; spouse  Outcome Summary: OT mayo on this date.  Pt was mod A for bed mobility.  He needed mod A of 2 for sup to sit and mod A for toilet transfer with safety bar and raised toilet seat.  Mod A of 2 for ambulation to/from bathroom.  Min to mod A needed to don/doff socks.  Rec 24/7 care or skilled nursing when D/C from hospita.

## 2020-03-20 NOTE — PROGRESS NOTES
Adult Nutrition  Assessment    Patient Name:  Jonnathan Albert  YOB: 1945  MRN: [id number]  Admit Date:  3/17/2020    Assessment Date:  3/20/2020    Comments:  Pt is s/p paracentesis. Expect d/c today, will f/u for EGD for ? Esophageal varices per MD notes. Reg diet, 1500ml fluid restriction w/ whole milk TID and snacks send BID. Intake 50-75% meals. Labs and meds noted. Admit wt 145# and currently at 136#/BMI 20.7, ? Down after paracentesis, intakes are moderate. RD to follow hospital course.    Reason for Assessment     Row Name 03/20/20 1219          Reason for Assessment    Reason For Assessment  follow-up protocol     Diagnosis  liver disease           Anthropometrics     Row Name 03/20/20 0500          Anthropometrics    Weight  62 kg (136 lb 9.6 oz)         Labs/Tests/Procedures/Meds     Row Name 03/20/20 1219          Labs/Procedures/Meds    Lab Results Reviewed  reviewed     Lab Results Comments  Glu 75, Alb 2.4L        Diagnostic Tests/Procedures    Diagnostic Test/Procedure Reviewed  reviewed     Diagnostic Test/Procedures Comments  s/p paracentesis        Medications    Pertinent Medications Reviewed  reviewed     Pertinent Medications Comments  sasix 40Qd, Fe, Vit  D             Nutrition Prescription Ordered     Row Name 03/20/20 1219          Nutrition Prescription PO    Current PO Diet  Regular     Supplement  Milk     Supplement Frequency  3 times a day     Common Modifiers  Fluid Restriction     Fluid Restriction mL per Day  1500 mL         Evaluation of Received Nutrient/Fluid Intake     Row Name 03/20/20 1219          PO Evaluation    Number of Days PO Intake Evaluated  2 days     Number of Meals  5     % PO Intake  75x3, 50x2               Electronically signed by:  Maria R Armendariz RD  03/20/20 12:21

## 2020-03-21 NOTE — OUTREACH NOTE
Prep Survey      Responses   Scientology facility patient discharged from?  [facility]   Is LACE score < 7 ?  No   Eligibility  Readm Mgmt   Discharge diagnosis  Cirrhosis of liver with ascites, s/p paracentesis 6.5 L removed,  CAD w/o angina, Vit Ddef. , vascular dementia, severe malnutrition, physical deconditioning, Sacral wound, chronic Hep C, Acquired hypothyroidism, BPH, GERD, essential HTN   Does the patient have one of the following disease processes/diagnoses(primary or secondary)?  Other   Does the patient have Home health ordered?  Yes   What is the Home health agency?   [facility]   Is there a DME ordered?  No   Comments regarding appointments  Office will call   Prep survey completed?  Yes          dAilene Bowman RN

## 2020-03-26 NOTE — PROGRESS NOTES
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 mechanism (CMS/HCC)     12. Benign prostatic hyperplasia, unspecified whether lower urinary tract symptoms present     13. Acquired hypothyroidism     14. At high risk for falls     15. Cerebral atrophy (CMS/HCC)     16. Hypotension, unspecified hypotension type            -cirrhosis of liver with ascites  - GI following.  Has upcoming EGD for possible esphageal varices,  On lasix 40 mg daily and aldactone 25 mg daily.  Check CMP along with ammonia level. Recent ammonia level normal. Strict I&Os along with low sodium diet. Information provided today. Had recent paracentesis   -HTN/hypotension  - will stop norvasc 5 mg daily along with altace 5 mg daily.  .   -GERD - pepcid 40 mg daily.  -high fall risk - recommend pt use wheelchair or walker   -vitamin D deficiency - vitamin D daily   -CAD - on aspirin 81 mg ddaily. plavix held   -chronic hepatitis C - referred to GI  -BPH - on flomax 0.4 mg PO qhs   -Hypothyroidism - on synthroid 50 mcg check thyroid studies   -CVA/Vascular Dementia - on namenda  5 mg PO BID  -severe malnutrition - weight stable  -sacral wound   -advised pt to be safe and call with questions and concerns  -advised pt to go to ER or call 911 if symptoms worrisome or severe  -advised pt to followup with specialist and referrals  -advisedp t to stay at home due to COVID-19 pandemic   -recheck in 1 month  for phone visit or video visit           This document has been electronically signed by Maxi Morales MD on March 31, 2020 14:15

## 2020-03-27 NOTE — OUTREACH NOTE
Medical Week 1 Survey      Responses   [facility] patient discharged from?  [place]   Does the patient have one of the following disease processes/diagnoses(primary or secondary)?  Other   Is there a successful TCM telephone encounter documented?  No   Week 1 attempt successful?  Yes   Call start time  0900   Revoke  Change in health status-moved to LTC/SNF/Hospice [Patient was moved to readmission rehab]   Call end time  0915   Discharge diagnosis  Cirrhosis of liver with ascites, s/p paracentesis 6.5 L removed,  CAD w/o angina, Vit Ddef. , vascular dementia, severe malnutrition, physical deconditioning, Sacral wound, chronic Hep C, Acquired hypothyroidism, BPH, GERD, essential HTN          Sameer Doshi RN

## 2020-03-31 PROBLEM — I95.9 HYPOTENSION: Status: ACTIVE | Noted: 2020-01-01

## 2020-03-31 NOTE — PATIENT INSTRUCTIONS
Stop ramipril and norvasc  For blood pressure    Continue on aldactone and lasix     Will need labwork to check CMP and ammonia level

## 2020-03-31 NOTE — TELEPHONE ENCOUNTER
Made pt's nurse (Keiry) at [facility] aware pt is to discontinue Norvasc and Ramipril and to fax lab results to office.

## 2020-03-31 NOTE — TELEPHONE ENCOUNTER
----- Message from Maxi Morales MD sent at 3/31/2020  2:24 PM CDT -----  Regarding: nursing home   Please call Bertrand Wong regarding pt's discontinuation of norvasc 5 mg canas and ramipril 5 mg daily. Due to blood pressure being low.    Pt can continue to take lasix and aldactone    Also pt will need labwork to be redrawn to recheck his electrolytes and his ammonia level. Thanks

## 2020-04-07 NOTE — TELEPHONE ENCOUNTER
Emeli from  called to make aware pt is being discharged on 4/11/20 from SNF. SNF recommended Nursing care, PT/OT, and Aide. Emeli stated they are limiting the amount of foot traffic to homes due to COVID. Emeli stated that they will send nurse and PT to evaluate and treat. They are not providing aide services at this time due to COVID.  wanted to make sure  was agreeable with this and would follow pt. I made aware that was fine.

## 2020-04-13 NOTE — TELEPHONE ENCOUNTER
Spoke with Erin from [facility]. She stated that pt was admitted to them over the weekend and wife told them she would like to have hospice for the pt instead. Gave Erin verbal to go ahead with hospice evaluation per . Erin stated wife told them after eval should would decide if she wanted [facility] or previous hospice company that was used. Erin will let us know what decision is made.

## 2020-04-14 NOTE — TELEPHONE ENCOUNTER
Spoke with Leonardo and made aware for  to follow pt. Leonardo stated they do not need order or referral.

## 2020-04-14 NOTE — TELEPHONE ENCOUNTER
I recommend pt followup with Hospice with Dr. Wu or hospice physician. Let me know if they need an order or referral. Thanks

## 2020-04-14 NOTE — TELEPHONE ENCOUNTER
Shannan with [facility] called stating that they need a statement stating that the cirrhosis that  has is an end stage cirrhosis based on his recent labs and hospitalization.

## 2020-04-14 NOTE — TELEPHONE ENCOUNTER
Leonardo from [facility] would like a call back regarding hospice for Mr. Albert. She is wanting to know if you want to follow him or Dr. Wu director of hospice. Please call her at [phone]

## 2020-04-16 NOTE — TELEPHONE ENCOUNTER
I think pt was taking this either prescribed from Cardiology or Neurology. Please ask pt's wife if pt has seen any specialist. It may be for stroke prevention. If he sees Neurology or Cardiology. I will need any office notes. Thanks

## 2020-04-16 NOTE — TELEPHONE ENCOUNTER
PT's wife, Catrina, called for clarification on the following medication: clopidogrel (PLAVIX) 75 MG tablet.    States she can't remember, but feels like at one point PCP told PT to discontinue the medication. Catrina would like clinical staff to call back to confirm whether or not she should discontinue PT's medication: [phone].

## 2020-04-16 NOTE — TELEPHONE ENCOUNTER
Spoke with Catrina and she stated that Lucille is the one that had been prescribing the Plavix. She stated that she thought you had told her to stop the Plavix on pt's last visit.

## 2020-04-21 NOTE — PROGRESS NOTES
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   -CVA/Vascular Dementia - on namenda  5 mg PO BID  -severe malnutrition - weight stable  -sacral wound  - will try to get pt hospital bed   -advised pt to be safe and call with questions and concerns  -advised pt to go to ER or call 911 if symptoms worrisome or severe  -advised pt to followup with specialist and referrals  -advisedp t to stay at home due to COVID-19 pandemic   This visit has been rescheduled as a phone visit to comply with patient safety concerns in accordance with CDC recommendations. Total time of discussion was 25  minutes.   -recheck  In 2 weeks             This document has been electronically signed by Maxi Morales MD on April 24, 2020 13:42

## 2020-04-27 NOTE — TELEPHONE ENCOUNTER
She wants to know if the bed was coming today so the home health nurse can help her transfer Mr. Albert from one bed to the other. And she wants it sent to [facility] on [address]

## 2020-05-05 NOTE — PROGRESS NOTES
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 flomax 0.4 mg PO qhs   -Hypothyroidism - on synthroid 50 mcg check thyroid studies   -CVA/Vascular Dementia - on namenda  5 mg PO BID  -severe malnutrition - weight stable  -sacral wound  - will try to get pt hospital bed   -advised pt to be safe and call with questions and concerns  -advised pt to go to ER or call 911 if symptoms worrisome or severe  -advised pt to followup with specialist and referrals  -advisedp t to stay at home due to COVID-19 pandemic   This visit has been rescheduled as a phone visit to comply with patient safety concerns in accordance with CDC recommendations. Total time of discussion was 25  minutes.   -recheck in 1 month or sooner if any problems         This document has been electronically signed by Maxi Morales MD on May 8, 2020 14:12
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
## 2024-03-24 NOTE — TELEPHONE ENCOUNTER
----- Message from Maxi Morales MD sent at 3/11/2020  7:17 AM CDT -----  Please call pt's wife    [id number] at 4.66 pt is not diabetic or prediabetic    Vitamin D is low and recommend pt start taking Vitamin D3 38582 units once a week give 4 pills and 3 refills     Vitamin B12 levels normal    Pt does have hepatitis C antibodies on labwork awaiting hep C RNA to see if pt has active disease    On thyroid studies. Thyroid is still slightly underactive. Improved from last check. Recommend pt go up on synthroid from 25 to 50 mcg daily. Give 30 pills and 3 refills    On CMP calcium levels slightly low and recommend he increase his calcium intake with supplement OTC or calcium rich foods    On lipid panel HDL is low and recommend heart healthy diet    On CBC pt is anemic along with platelets low.  He will eventually need to see Hematologist. Awaiting US of liver. Please update pt on status and when he is to get this done    Recheck on next visit. Thanks   89-year-old male, history of hypothyroidism, GERD, arthritis, brought in by EMS for generalized weakness.  Was not able to get off the toilet bowl as per wife.  No chest pain or abdominal pain.  Exam shows alert patient in no distress, HEENT NCAT PERRL, neck supple, lungs clear, RR S1S2, abdomen soft NT +BS, no CCE, neuro A&OX3 CN intact no deficits.